# Patient Record
Sex: FEMALE | Race: WHITE | NOT HISPANIC OR LATINO | Employment: FULL TIME | URBAN - NONMETROPOLITAN AREA
[De-identification: names, ages, dates, MRNs, and addresses within clinical notes are randomized per-mention and may not be internally consistent; named-entity substitution may affect disease eponyms.]

---

## 2017-08-03 ENCOUNTER — HISTORY (OUTPATIENT)
Dept: FAMILY MEDICINE | Facility: OTHER | Age: 22
End: 2017-08-03

## 2017-08-03 ENCOUNTER — OFFICE VISIT - GICH (OUTPATIENT)
Dept: FAMILY MEDICINE | Facility: OTHER | Age: 22
End: 2017-08-03

## 2017-08-03 DIAGNOSIS — R51.9 HEADACHE: ICD-10-CM

## 2017-08-03 DIAGNOSIS — J30.89 OTHER ALLERGIC RHINITIS: ICD-10-CM

## 2017-08-03 DIAGNOSIS — H93.8X3 OTHER SPECIFIED DISORDERS OF EAR, BILATERAL: ICD-10-CM

## 2017-08-03 DIAGNOSIS — R09.82 POSTNASAL DRIP: ICD-10-CM

## 2017-08-03 DIAGNOSIS — J34.89 OTHER SPECIFIED DISORDERS OF NOSE AND NASAL SINUSES: ICD-10-CM

## 2017-09-24 ENCOUNTER — OFFICE VISIT - GICH (OUTPATIENT)
Dept: FAMILY MEDICINE | Facility: OTHER | Age: 22
End: 2017-09-24

## 2017-09-24 ENCOUNTER — HISTORY (OUTPATIENT)
Dept: FAMILY MEDICINE | Facility: OTHER | Age: 22
End: 2017-09-24

## 2017-09-24 DIAGNOSIS — Z01.89 ENCOUNTER FOR OTHER SPECIFIED SPECIAL EXAMINATIONS (CODE): ICD-10-CM

## 2017-09-24 DIAGNOSIS — J02.9 ACUTE PHARYNGITIS: ICD-10-CM

## 2017-09-24 DIAGNOSIS — K12.2 CELLULITIS AND ABSCESS OF MOUTH: ICD-10-CM

## 2017-09-24 DIAGNOSIS — J03.90 ACUTE TONSILLITIS: ICD-10-CM

## 2017-09-24 LAB — STREP A ANTIGEN - HISTORICAL: NEGATIVE

## 2017-09-24 ASSESSMENT — PATIENT HEALTH QUESTIONNAIRE - PHQ9: SUM OF ALL RESPONSES TO PHQ QUESTIONS 1-9: 4

## 2017-11-09 ENCOUNTER — HISTORY (OUTPATIENT)
Dept: EMERGENCY MEDICINE | Facility: OTHER | Age: 22
End: 2017-11-09

## 2017-11-11 ENCOUNTER — COMMUNICATION - GICH (OUTPATIENT)
Dept: EMERGENCY MEDICINE | Facility: OTHER | Age: 22
End: 2017-11-11

## 2017-11-11 ENCOUNTER — HOSPITAL ENCOUNTER (OUTPATIENT)
Dept: LAB | Facility: OTHER | Age: 22
End: 2017-11-11

## 2017-11-11 DIAGNOSIS — N93.9 ABNORMAL UTERINE AND VAGINAL BLEEDING, UNSPECIFIED (CODE): ICD-10-CM

## 2017-11-11 LAB — HCG BETA QUANT,PREGNANCY - HISTORICAL: ABNORMAL MIU/ML

## 2017-11-12 ENCOUNTER — COMMUNICATION - GICH (OUTPATIENT)
Dept: EMERGENCY MEDICINE | Facility: OTHER | Age: 22
End: 2017-11-12

## 2017-12-27 NOTE — PROGRESS NOTES
Patient Information     Patient Name MRN Iris Graves [id number] Female 1995      Progress Notes by Cherrie Chavez NP at 2017 11:45 AM     Author:  Cherrie Chavez NP Service:  (none) Author Type:  PHYS- Nurse Practitioner     Filed:  2017  2:47 PM Encounter Date:  2017 Status:  Signed     :  Cherrie Chavez NP (PHYS- Nurse Practitioner)            HPI:    Iris Negron is a 22 y.o. female who presents to clinic today for strep testing.   Sore throat x 5 days, worsening.  Painful to swallow, eat, drink.   Pain is only on right side of throat, right tonsil.  Runny nose and sneezing from allergies.  No cough.  No known fevers.  Appetite decreased from painful swallowing.  Headaches the past few days.  No ear pain.  Pressure in right ear.  Chronic body aches.  Energy decreased, fair.  No drooling.  No difficulty opening mouth.   Taking Ibuprofen and Tylenol without relief.  Tried cough drops.          Past Medical History:     Diagnosis  Date     Acne      Asthma      Depression      Past Surgical History:      Procedure  Laterality Date     APPENDECTOMY       WISDOM TEETH EXTRACTION        Social History       Substance Use Topics         Smoking status:   Current Every Day Smoker     Smokeless tobacco:   Never Used      Comment: smokes ecigs      Alcohol use   No     Current Outpatient Prescriptions       Medication  Sig Dispense Refill     albuterol HFA (VENTOLIN HFA) 90 mcg/actuation inhaler Inhale 2 Puffs by mouth 4 times daily if needed. 1 Inhaler 0     diphenhydrAMINE (BENADRYL) 25 mg capsule Take 25 mg by mouth every 4 hours if needed.         EPINEPHrine (EPIPEN) 0.3 mg/0.3 mL injection Inject 0.3 mg intramuscular one time if needed for Allergic Reaction for up to 1 dose. 2 Each 3     FLUoxetine (PROZAC) 10 mg capsule Take 10 mg by mouth.       fluticasone (FLOVENT) 110 mcg/Actuation inhaler Inhale 1 Puff by mouth.       ibuprofen (ADVIL; MOTRIN) 800  mg tablet Take 1 tablet by mouth 3 times daily if needed. 90 tablet 0     loratadine (CLARITIN) 10 mg tablet Take 10 mg by mouth once daily.       MULTIVITAMIN ORAL Take 1 Tab by mouth once daily.       sod bicarb-sod chlor-neti pot pkdv Irrigate sinuses 2-3 times daily as needed for congestion. 1 Each 0     No current facility-administered medications for this visit.      Medications have been reviewed by me and are current to the best of my knowledge and ability.    Allergies      Allergen   Reactions     Asa Buff (Mag Carb-Al Glyc) [Aspirin, Buffered]  Other - Describe In Comment Field     Dizzy      Gluten  Diarrhea     Iodine  Hives     Latex  Hives     Novacaine [Procaine]  Nausea And Vomiting     Other [Unlisted Allergen (Include Detail In Comments)]  Anaphylaxis and Hives     Melons, horse radish, mushrooms      Strawberry  Anaphylaxis and Hives       Past medical history, past surgical history, current medications and allergies reviewed and accurate to the best of my knowledge.        ROS:  Refer to HPI    /70  Pulse 60  Temp 98.9  F (37.2  C) (Tympanic)   Wt 92.3 kg (203 lb 6.4 oz)  BMI 37.2 kg/m2    EXAM:  General Appearance: Well appearing adult female, non ill appearance, appropriate appearance for age. No acute distress  Head: normocephalic, atraumatic  Ears: Left TM with bony landmarks appreciated, no erythema, no effusion, no bulging, no purulence.  Right TM with bony landmarks appreciated, no erythema, no effusion, no bulging, no purulence.   Left auditory canal clear.  Right auditory canal clear.  Normal external ears, non tender.  Eyes: conjunctivae normal, no drainage  Orophayrnx: moist mucous membranes, posterior pharynx with erythema, Right tonsil with 2+ hypertrophy and erythema, no exudates, Left tonsil with 1-2+ hypertrophy, left without erythema or exudates, no petechiae, no post nasal drip seen, no oral ulcers.  Uvula midline without deviation.  No trismus.  No drooling.  No  muffled speech.    Neck: supple without adenopathy - no palpable lymph node swelling but entire right side of neck with tenderness to palpation   Respiratory: normal chest wall and respirations.  Normal effort.  Clear to auscultation bilaterally, no wheezing, crackles or rhonchi.  No increased work of breathing.  No cough appreciated.  Cardiac: RRR with no murmurs  Musculoskeletal:  Normal gait.  Equal movement of bilateral upper extremities.  Equal movement of bilateral lower extremities.    Dermatological: no facial erythema or rash   Psychological: normal affect, alert and pleasant      Labs:  Results for orders placed or performed in visit on 09/24/17      RAPID STREP WITH REFLEX CULTURE      Result  Value Ref Range    STREP A ANTIGEN           Negative Negative             ASSESSMENT/PLAN:    ICD-10-CM    1. Sore throat J02.9 RAPID STREP WITH REFLEX CULTURE      RAPID STREP WITH REFLEX CULTURE   2. Patient request for diagnostic testing Z01.89 RAPID STREP WITH REFLEX CULTURE      RAPID STREP WITH REFLEX CULTURE   3. Oral cellulitis K12.2 dexamethasone 10 mg inj for oral, topical or inhalation use (DECADRON)      amoxicillin-clavulanate 875-125 mg tablet (AUGMENTIN)   4. Acute tonsillitis, unspecified J03.90 dexamethasone 10 mg inj for oral, topical or inhalation use (DECADRON)      amoxicillin-clavulanate 875-125 mg tablet (AUGMENTIN)         Negative rapid strep test  Augmentin 875-125 mg BID x 10 days for acute right sided tonsillitis   Decadron 10 mg oral x 1 administered in clinic for throat/tonsil swelling, pain  Encouraged fluids  Symptomatic treatment - salt water gargles, honey, elevation,  lozenges, etc   Tylenol or ibuprofen PRN  Dicussed warning signs/symptoms that require immediate follow up including but not limited to: drooling, difficulty talking, choking, high fevers, facial or neck swelling, etc   Follow up if symptoms persist or worsen or concerns          Patient Instructions   Right sided  tonsillitis    Augmentin twice daily x 10 days       Decadron (steroid) for pain and swelling given in clinic      Salt water gargles    Fluids    Honey, tea, etc      Follow up if any worsening or concerns

## 2017-12-27 NOTE — PROGRESS NOTES
Patient Information     Patient Name MRN Sex Iris Tsang [id number] Female 1995      Progress Notes by Cherrie Chavez NP at 8/3/2017  3:15 PM     Author:  Cherrie Chavez NP Service:  (none) Author Type:  PHYS- Nurse Practitioner     Filed:  8/3/2017  5:11 PM Encounter Date:  8/3/2017 Status:  Signed     :  Cherrie Chavez NP (PHYS- Nurse Practitioner)            HPI:    Iris Negron is a 22 y.o. female who presents to clinic today for sinus.   Sneezing for the past few weeks, worsening.  Post nasal drainage for the past few days, causing her to vomit today from all the phlegm.   No abdominal pain.   Feeling dizzy.   Headaches for the past week, progressively worsening.  Sinus pressure for the past few days.  Mild nasal congestion.   Throat irritated.   Coughing due to irritation and drainage.  No chest tightness or shortness of breath.  No fevers.  Chills and sweats.  Appetite varies.   Energy decreased.  Taking Benadryl.  No Albuterol inhaler or Flovent inhaler use for the past week.  No Claritin use in the past 1-2 months.  No nasal spray use - states she gets nosebleeds from use.  No netti pot use.            Past Medical History:     Diagnosis  Date     Acne      Asthma      Depression      Past Surgical History:      Procedure  Laterality Date     APPENDECTOMY  2001     WISDOM TEETH EXTRACTION        Social History       Substance Use Topics         Smoking status:   Current Every Day Smoker     Smokeless tobacco:   Never Used      Comment: smokes ecigs      Alcohol use   No     Current Outpatient Prescriptions       Medication  Sig Dispense Refill     albuterol HFA (VENTOLIN HFA) 90 mcg/actuation inhaler Inhale 2 Puffs by mouth 4 times daily if needed. 1 Inhaler 0     diphenhydrAMINE (BENADRYL) 25 mg capsule Take 25 mg by mouth every 4 hours if needed.         EPINEPHrine (EPIPEN) 0.3 mg/0.3 mL injection Inject 0.3 mg intramuscular one time if needed for Allergic  "Reaction for up to 1 dose. 2 Each 3     FLUoxetine (PROZAC) 10 mg capsule Take 10 mg by mouth.       fluticasone (FLOVENT) 110 mcg/Actuation inhaler Inhale 1 Puff by mouth.       ibuprofen (ADVIL; MOTRIN) 800 mg tablet Take 1 tablet by mouth 3 times daily if needed. 90 tablet 0     loratadine (CLARITIN) 10 mg tablet Take 10 mg by mouth once daily.       MULTIVITAMIN ORAL Take 1 Tab by mouth once daily.       sod bicarb-sod chlor-neti pot pkdv Irrigate sinuses 2-3 times daily as needed for congestion. 1 Each 0     No current facility-administered medications for this visit.      Medications have been reviewed by me and are current to the best of my knowledge and ability.    Allergies      Allergen   Reactions     Asa Buff (Mag Carb-Al Glyc) [Aspirin, Buffered]  Other - Describe In Comment Field     Dizzy      Gluten  Diarrhea     Iodine  Hives     Latex  Hives     Novacaine [Procaine]  Nausea And Vomiting     Other [Unlisted Allergen (Include Detail In Comments)]  Anaphylaxis and Hives     Melons, horse radish, mushrooms      Strawberry  Anaphylaxis and Hives       Past medical history, past surgical history, current medications and allergies reviewed and accurate to the best of my knowledge.        ROS:  Refer to HPI    /72  Pulse 68  Temp 99.4  F (37.4  C) (Tympanic)   Ht 1.575 m (5' 2\")  Wt 93.1 kg (205 lb 3.2 oz)  BMI 37.53 kg/m2    EXAM:  General Appearance: Well appearing adult female, appropriate appearance for age. No acute distress  Head: normocephalic, atraumatic  Ears: Left TM with bony landmarks appreciated, no erythema, no effusion, no bulging, no purulence.  Right TM with bony landmarks appreciated, no erythema, no effusion, no bulging, no purulence.   Left auditory canal clear.  Right auditory canal clear.  Normal external ears, non tender.  Eyes: conjunctivae normal, no drainage  Orophayrnx: moist mucous membranes, posterior pharynx without erythema, tonsils without hypertrophy, no " erythema, no exudates or petechiae, no post nasal drip seen.    Sinuses:  Bilateral sinus tenderness upon palpation of the frontal and maxillary sinuses  Nose:  Bilateral nares without erythema or edema; clear drainage present  Neck: supple without adenopathy  Respiratory: normal chest wall and respirations.  Normal effort.  Clear to auscultation bilaterally, no wheezing, crackles or rhonchi.  No increased work of breathing.  No cough appreciated  Cardiac: RRR with no murmurs  Musculoskeletal:  Normal gait.  Equal movement of bilateral upper extremities.  Equal movement of bilateral lower extremities.    Psychological: normal affect, alert and pleasant        ASSESSMENT/PLAN:    ICD-10-CM    1. Sinus pressure J34.89 predniSONE (DELTASONE) 20 mg tablet   2. Environmental and seasonal allergies J30.89    3. Sinus headache R51 predniSONE (DELTASONE) 20 mg tablet   4. Ear pressure, bilateral H93.8X3    5. Post-nasal drainage R09.82          Likely combination of allergies and viral illness.   No indications for antibiotics.  Prednisone 40 mg daily x 3 days for sinus inflammation and pressure.   Take with food.  Restart Claritin 10 mg daily   Symptomatic treatment - elevation, humidifier, sinus rinse/netti pot, Benadryl PRN, etc   Tylenol or ibuprofen PRN  Follow up if symptoms persist or worsen or concerns          Patient Instructions   Prednisone once daily x 3 days for sinus pressure/headaches/inflammation.   Take with food.    Restart Claritin      Follow up as needed if worsening

## 2017-12-28 NOTE — TELEPHONE ENCOUNTER
Patient Information     Patient Name MRN Iris Graves [id number] Female 1995      Telephone Encounter by Christel Chou RN at 2017  6:15 PM     Author:  Christel Chou RN Service:  (none) Author Type:  NURS- Registered Nurse     Filed:  2017  6:17 PM Encounter Date:  2017 Status:  Signed     :  Christel Chou RN (NURS- Registered Nurse)            Call received from patient wanting to know results of Beta HCG drawn today. Dr. Buchanan consulted and return call placed to patient notifying her that her lab values had increased as expected with a viable pregnancy. Instructed to contact PCP wioth any questions or concerns. CHRISTEL CHOU RN ....................  2017   6:16 PM

## 2017-12-28 NOTE — PATIENT INSTRUCTIONS
Patient Information     Patient Name MRN Sex Iris Tsang [id number] Female 1995      Patient Instructions by Cherrie Chavez NP at 2017 11:45 AM     Author:  Cherrie Chavez NP Service:  (none) Author Type:  PHYS- Nurse Practitioner     Filed:  2017 12:09 PM Encounter Date:  2017 Status:  Signed     :  Cherrie Chavez NP (PHYS- Nurse Practitioner)            Right sided tonsillitis    Augmentin twice daily x 10 days       Decadron (steroid) for pain and swelling given in clinic      Salt water gargles    Fluids    Honey, tea, etc      Follow up if any worsening or concerns

## 2017-12-28 NOTE — TELEPHONE ENCOUNTER
Patient Information     Patient Name MRN Sex Iris Tsang [id number] Female 1995      Telephone Encounter by Christel Chou RN at 2017  7:55 AM     Author:  Christel Chou RN Service:  (none) Author Type:  NURS- Registered Nurse     Filed:  2017  7:56 AM Encounter Date:  2017 Status:  Signed     :  Christel Chou RN (NURS- Registered Nurse)            Opened in error

## 2017-12-28 NOTE — PATIENT INSTRUCTIONS
Patient Information     Patient Name MRN Iris Graves [id number] Female 1995      Patient Instructions by Cherrie Chavez NP at 8/3/2017  3:15 PM     Author:  Cherrie Chavez NP Service:  (none) Author Type:  PHYS- Nurse Practitioner     Filed:  8/3/2017  3:29 PM Encounter Date:  8/3/2017 Status:  Signed     :  Cherrie Chavez NP (PHYS- Nurse Practitioner)            Prednisone once daily x 3 days for sinus pressure/headaches/inflammation.   Take with food.    Restart Claritin      Follow up as needed if worsening

## 2017-12-30 NOTE — NURSING NOTE
Patient Information     Patient Name MRIris Rojas [id number] Female 1995      Nursing Note by Ember Rodriguez at 2017 11:45 AM     Author:  Ember Rodriguez Service:  (none) Author Type:  (none)     Filed:  2017 12:03 PM Encounter Date:  2017 Status:  Signed     :  Ember Rodriguez            Patient states that she has had a sore throat for 5 days.  The last couple it has become worse.  Hurts to eat or drink  Ember Rodriguez LPN 2017 11:30 AM

## 2017-12-30 NOTE — NURSING NOTE
Patient Information     Patient Name MRN Iris Graves [id number] Female 1995      Nursing Note by Marline Li at 8/3/2017  3:15 PM     Author:  Marline Li Service:  (none) Author Type:  (none)     Filed:  8/3/2017  3:20 PM Encounter Date:  8/3/2017 Status:  Signed     :  Marline Li            Patient presents to the clinic for sinus pressure and headaches that started weeks ago. Patient states she's been having sinus issues for weeks and the symptoms have been getting worse.  Marline GIL, EBER.......8/3/2017..3:11 PM

## 2018-01-25 VITALS
BODY MASS INDEX: 37.76 KG/M2 | HEART RATE: 68 BPM | WEIGHT: 205.2 LBS | SYSTOLIC BLOOD PRESSURE: 128 MMHG | DIASTOLIC BLOOD PRESSURE: 72 MMHG | TEMPERATURE: 99.4 F | HEIGHT: 62 IN

## 2018-01-25 VITALS
TEMPERATURE: 98.9 F | BODY MASS INDEX: 37.19 KG/M2 | WEIGHT: 203.4 LBS | DIASTOLIC BLOOD PRESSURE: 70 MMHG | HEART RATE: 60 BPM | SYSTOLIC BLOOD PRESSURE: 110 MMHG

## 2018-02-01 ASSESSMENT — PATIENT HEALTH QUESTIONNAIRE - PHQ9: SUM OF ALL RESPONSES TO PHQ QUESTIONS 1-9: 4

## 2018-02-09 ENCOUNTER — DOCUMENTATION ONLY (OUTPATIENT)
Dept: FAMILY MEDICINE | Facility: OTHER | Age: 23
End: 2018-02-09

## 2018-02-09 PROBLEM — J30.9 ALLERGIC RHINITIS: Status: ACTIVE | Noted: 2018-02-09

## 2018-02-09 PROBLEM — J45.909 ASTHMA: Status: ACTIVE | Noted: 2018-02-09

## 2018-02-09 PROBLEM — N92.6 IRREGULAR MENSES: Status: ACTIVE | Noted: 2018-02-09

## 2018-02-09 PROBLEM — N94.6 DYSMENORRHEA: Status: ACTIVE | Noted: 2018-02-09

## 2018-02-09 RX ORDER — IBUPROFEN 800 MG/1
800 TABLET, FILM COATED ORAL 3 TIMES DAILY PRN
COMMUNITY
Start: 2015-01-07 | End: 2019-09-21

## 2018-02-09 RX ORDER — DIPHENHYDRAMINE HCL 25 MG
25 CAPSULE ORAL EVERY 4 HOURS PRN
COMMUNITY
End: 2022-11-27

## 2018-02-09 RX ORDER — FLUOXETINE 10 MG/1
10 CAPSULE ORAL
COMMUNITY
Start: 2016-03-14 | End: 2019-09-21

## 2018-02-09 RX ORDER — FLUTICASONE PROPIONATE 110 UG/1
1 AEROSOL, METERED RESPIRATORY (INHALATION)
COMMUNITY
Start: 2017-06-22 | End: 2018-06-03

## 2018-02-09 RX ORDER — EPINEPHRINE 0.3 MG/.3ML
0.3 INJECTION SUBCUTANEOUS ONCE
COMMUNITY
Start: 2016-08-03 | End: 2022-11-27

## 2018-02-09 RX ORDER — ALBUTEROL SULFATE 90 UG/1
2 AEROSOL, METERED RESPIRATORY (INHALATION) 4 TIMES DAILY PRN
COMMUNITY
Start: 2015-01-07

## 2018-02-09 RX ORDER — LORATADINE 10 MG/1
10 TABLET ORAL DAILY
COMMUNITY

## 2018-06-03 ENCOUNTER — HOSPITAL ENCOUNTER (EMERGENCY)
Facility: OTHER | Age: 23
Discharge: HOME | End: 2018-06-03
Attending: PHYSICIAN ASSISTANT | Admitting: PHYSICIAN ASSISTANT
Payer: COMMERCIAL

## 2018-06-03 VITALS
OXYGEN SATURATION: 100 % | WEIGHT: 213 LBS | TEMPERATURE: 99.8 F | DIASTOLIC BLOOD PRESSURE: 85 MMHG | HEIGHT: 62 IN | BODY MASS INDEX: 39.2 KG/M2 | HEART RATE: 86 BPM | SYSTOLIC BLOOD PRESSURE: 107 MMHG | RESPIRATION RATE: 16 BRPM

## 2018-06-03 DIAGNOSIS — O03.9 COMPLETE MISCARRIAGE: ICD-10-CM

## 2018-06-03 DIAGNOSIS — R04.0 EPISTAXIS: ICD-10-CM

## 2018-06-03 LAB
B-HCG SERPL-ACNC: 17 IU/L
HCG UR QL: NEGATIVE

## 2018-06-03 PROCEDURE — 99283 EMERGENCY DEPT VISIT LOW MDM: CPT | Performed by: PHYSICIAN ASSISTANT

## 2018-06-03 PROCEDURE — 99283 EMERGENCY DEPT VISIT LOW MDM: CPT | Mod: Z6 | Performed by: PHYSICIAN ASSISTANT

## 2018-06-03 PROCEDURE — 84702 CHORIONIC GONADOTROPIN TEST: CPT | Performed by: PHYSICIAN ASSISTANT

## 2018-06-03 PROCEDURE — 81025 URINE PREGNANCY TEST: CPT | Performed by: PHYSICIAN ASSISTANT

## 2018-06-03 PROCEDURE — 36415 COLL VENOUS BLD VENIPUNCTURE: CPT | Performed by: PHYSICIAN ASSISTANT

## 2018-06-03 PROCEDURE — 25000125 ZZHC RX 250: Performed by: PHYSICIAN ASSISTANT

## 2018-06-03 RX ORDER — TRANEXAMIC ACID 100 MG/ML
500 INJECTION, SOLUTION INTRAVENOUS ONCE
Status: COMPLETED | OUTPATIENT
Start: 2018-06-03 | End: 2018-06-03

## 2018-06-03 RX ADMIN — TRANEXAMIC ACID 500 MG: 100 INJECTION, SOLUTION INTRAVENOUS at 13:50

## 2018-06-03 ASSESSMENT — ENCOUNTER SYMPTOMS
NECK STIFFNESS: 0
FEVER: 0
SHORTNESS OF BREATH: 0
EYE REDNESS: 0
HEADACHES: 0
COLOR CHANGE: 0
ABDOMINAL PAIN: 0
DIFFICULTY URINATING: 0
CONFUSION: 0
ARTHRALGIAS: 0

## 2018-06-03 NOTE — ED PROVIDER NOTES
History     Chief Complaint   Patient presents with     Epistaxis     HPI Comments: This is a 23 yo female who uis currently 6 weeks pregnant .  She has a fairly complex Hx of a liver mass and liver lobectomy.  She was working today when she had nasal bleed that lasted 10 minutes. She had a dime sized nasal blood clot come out and her bleeding has resolved at this point but EMS was called and she is brought via ambulance to the ER. She reports she has had some mionimal vaginal spotting at times but none today.  She has been seeing Dr. Powell at [facility] for her pregnancy.  She reports she has had 3  miscarriages in the past.  She does have some concerns that she may have had another miscarriage.       The history is provided by the patient and the EMS personnel.     Problem List:    Patient Active Problem List    Diagnosis Date Noted     Dysmenorrhea 02/09/2018     Priority: Medium     Irregular menses 02/09/2018     Priority: Medium     Allergic rhinitis 02/09/2018     Priority: Medium     Overview:   Intermittent, seasonal       Asthma 02/09/2018     Priority: Medium     Allergy 08/03/2016     Priority: Medium     Overview:   Strawberries and melons       Chronic tension-type headache, not intractable 08/03/2016     Priority: Medium     Gastroenteritis 12/28/2012     Priority: Medium     Dysthymic disorder 03/29/2011     Priority: Medium     Obesity 02/16/2010     Priority: Medium     Acne, mild 02/08/2008     Priority: Medium        Past Medical History:    Past Medical History:   Diagnosis Date     Acne      Major depressive disorder, single episode      Uncomplicated asthma        Past Surgical History:    Past Surgical History:   Procedure Laterality Date     APPENDECTOMY OPEN      2001     EXTRACTION(S) DENTAL      2011       Family History:    Family History   Problem Relation Age of Onset     Other - See Comments Mother      Didelphic uterus/menorrhagia/hysterectomy at age 25     Substance Abuse Mother      " Alcohol/Drug,CD     Other - See Comments Paternal Grandmother      Psychiatric illness,Mental illness     Breast Cancer Maternal Grandmother      Cancer-breast     DIABETES Maternal Grandmother      Diabetes     Hypertension Maternal Grandmother      Hypertension     DIABETES Maternal Aunt      Diabetes     DIABETES Maternal Grandfather      Diabetes     Hypertension Maternal Grandfather      Hypertension     Hypertension Maternal Aunt      Hypertension     Substance Abuse Maternal Uncle      Alcohol/Drug     Substance Abuse Maternal Uncle      Alcohol/Drug       Social History:  Marital Status:  Single [1]  Social History   Substance Use Topics     Smoking status: Former Smoker     Smokeless tobacco: Never Used      Comment: Quit smoking: smokes ecigs     Alcohol use No        Medications:      albuterol (PROAIR HFA/PROVENTIL HFA/VENTOLIN HFA) 108 (90 BASE) MCG/ACT Inhaler   amitriptyline (ELAVIL) 25 MG tablet   FLUoxetine (PROZAC) 10 MG capsule   ibuprofen (ADVIL/MOTRIN) 800 MG tablet   Prenatal Vit-DSS-Fe Cbn-FA (PRENATAL AD PO)   diphenhydrAMINE (BENADRYL) 25 MG capsule   EPINEPHrine (EPIPEN/ADRENACLICK/OR ANY BX GENERIC EQUIV) 0.3 MG/0.3ML injection 2-pack   loratadine (CLARITIN) 10 MG tablet         Review of Systems   Constitutional: Negative for fever.   HENT: Positive for nosebleeds. Negative for congestion.    Eyes: Negative for redness.   Respiratory: Negative for shortness of breath.    Cardiovascular: Negative for chest pain.   Gastrointestinal: Negative for abdominal pain.   Genitourinary: Positive for vaginal bleeding. Negative for difficulty urinating.   Musculoskeletal: Negative for arthralgias and neck stiffness.   Skin: Negative for color change.   Neurological: Negative for headaches.   Psychiatric/Behavioral: Negative for confusion.       Physical Exam   BP: (!) 128/100  Pulse: 97  Temp: 99.8  F (37.7  C)  Resp: 18  Height: 157.5 cm (5' 2\")  Weight: 96.6 kg (213 lb)  SpO2: 100 %      Physical " Exam   Constitutional: She is oriented to person, place, and time. No distress.   HENT:   Head: Atraumatic.   Nose: Epistaxis is observed.   Mouth/Throat: Oropharynx is clear and moist. No oropharyngeal exudate.   Left nasal bleeding , resolved at this time.    Eyes: Pupils are equal, round, and reactive to light. No scleral icterus.   Cardiovascular: Normal heart sounds and intact distal pulses.    Pulmonary/Chest: Breath sounds normal. No respiratory distress.   Abdominal: Soft. Bowel sounds are normal. There is no tenderness.   Musculoskeletal: She exhibits no edema or tenderness.   Neurological: She is alert and oriented to person, place, and time.   Skin: Skin is warm. No rash noted. She is not diaphoretic.       ED Course     ED Course     Procedures             Results for orders placed or performed during the hospital encounter of 06/03/18 (from the past 24 hour(s))   HCG qualitative urine (UPT)   Result Value Ref Range    HCG Qual Urine Negative NEG^Negative   HCG quantitative pregnancy   Result Value Ref Range    HCG Quantitative Serum 17 IU/L       Medications   tranexamic acid (CYKLOKAPRON) spray 500 mg (500 mg Both Nostrils Given 6/3/18 1350)       Assessments & Plan (with Medical Decision Making)     I have reviewed the nursing notes.    I have reviewed the findings, diagnosis, plan and need for follow up with the patient.      Discharge Medication List as of 6/3/2018  2:40 PM      START taking these medications    Details   phenylephrine (CHAN-SYNEPHRINE) 0.5 % spray Spray 1 drop into both nostrils every 4 hours as needed for congestion, Disp-15 mL, R-0, Local Print             Final diagnoses:   Epistaxis   Complete miscarriage     Afebrile.  VSS.  Left nare epistaxis that has resolved.  6 weeks pregnant per the patient.  She had tranexamic acid appied ansd she was observed over an extended time frame in the ER with no return of her epistaxis.  UA is unremarkable.  HCG is negative.  Quantitative HCG  shows decreased levels at 17 .  Sorry to  Inform the patient it appears she has miscarried again.  Consolation given.  Rx for neosyn if needed for further nasal bleeding.  Discussed further evaluation with a specialist for bleeding disorders due to her multiple miscarriages.  She will arrange this.  Follow up with her PCP if any concerns for further evaluation as needed.   6/3/2018   [facility]     Curt Robertson PA-C  06/03/18 1326

## 2018-06-03 NOTE — ED TRIAGE NOTES
Pt admit to [facility] via [facility].  Pt had a nose bleed lasting about 10 minutes today, passed a dime sized blood clot.  Bleeding has stopped but pt reports feeling light headed & dizzy.  Pt is 6 weeks pregnant but has had some vaginal bleeding recently and has had miscarriages in the past.  Pt also had a tumor removed from her liver on April 16th without complications.

## 2018-06-03 NOTE — ED AVS SNAPSHOT
[facility]    [address]    [place] [address]    Phone:  [phone]    Fax:  [phone]                                       Iris Negron   MRN: [id number]    Department:  [facility]   Date of Visit:  6/3/2018           After Visit Summary Signature Page     I have received my discharge instructions, and my questions have been answered. I have discussed any challenges I see with this plan with the nurse or doctor.    ..........................................................................................................................................  Patient/Patient Representative Signature      ..........................................................................................................................................  Patient Representative Print Name and Relationship to Patient    ..................................................               ................................................  Date                                            Time    ..........................................................................................................................................  Reviewed by Signature/Title    ...................................................              ..............................................  Date                                                            Time

## 2018-06-03 NOTE — ED AVS SNAPSHOT
[facility]    [address]    [place] [address]    Phone:  [phone]    Fax:  [phone]                                       Iris Negron   MRN: [id number]    Department:  [facility]   Date of Visit:  6/3/2018           Patient Information     Date Of Birth          1995        Your diagnoses for this visit were:     Epistaxis     Complete miscarriage        You were seen by Curt Robertson PA-C.      Follow-up Information     Schedule an appointment as soon as possible for a visit with Melba Powell.    Specialty:  Family Practice    Why:  As needed, If symptoms worsen    Contact information:    [facility]  [address] [facility] [address]  [phone]        Discharge References/Attachments     [facility] (ENGLISH)      24 Hour Appointment Hotline       To make an appointment at any [facility] ([facility]), call [phone] ([phone]). If you don't have a family doctor or clinic, we will help you find one. [place] clinics are conveniently located to serve the needs of you and your family.             Review of your medicines      START taking        Dose / Directions Last dose taken    phenylephrine 0.5 % spray   Commonly known as:  CHAN-SYNEPHRINE   Dose:  1 drop   Quantity:  15 mL        Spray 1 drop into both nostrils every 4 hours as needed for congestion   Refills:  0          Our records show that you are taking the medicines listed below. If these are incorrect, please call your family doctor or clinic.        Dose / Directions Last dose taken    albuterol 108 (90 Base) MCG/ACT Inhaler   Commonly known as:  PROAIR HFA/PROVENTIL HFA/VENTOLIN HFA   Dose:  2 puff        Inhale 2 puffs into the lungs 4 times daily as needed   Refills:  0        amitriptyline 25 MG tablet   Commonly known as:  ELAVIL   Dose:  25 mg        Take 25 mg by mouth   Refills:  0        diphenhydrAMINE 25 MG capsule   Commonly known as:  BENADRYL    Dose:  25 mg        Take 25 mg by mouth every 4 hours as needed   Refills:  0        EPINEPHrine 0.3 MG/0.3ML injection 2-pack   Commonly known as:  EPIPEN/ADRENACLICK/or ANY BX GENERIC EQUIV   Dose:  0.3 mg        Inject 0.3 mg into the muscle once   Refills:  0        FLUoxetine 10 MG capsule   Commonly known as:  PROzac   Dose:  10 mg        Take 10 mg by mouth   Refills:  0        ibuprofen 800 MG tablet   Commonly known as:  ADVIL/MOTRIN   Dose:  800 mg        Take 800 mg by mouth 3 times daily as needed   Refills:  0        loratadine 10 MG tablet   Commonly known as:  CLARITIN   Dose:  10 mg        Take 10 mg by mouth daily   Refills:  0        PRENATAL AD PO        Take by mouth daily   Refills:  0                Prescriptions were sent or printed at these locations (1 Prescription)                   [facility] - [place], [place] - [address] AT SEC of [facility] [address]   [address], [facility] [address]    Telephone:  [phone]   Fax:  [phone]   Hours:                  Printed at Department/Unit printer (1 of 1)         phenylephrine (CHAN-SYNEPHRINE) 0.5 % spray                Procedures and tests performed during your visit     HCG qualitative urine (UPT)    HCG quantitative pregnancy      Orders Needing Specimen Collection     None      Pending Results     No orders found from 6/1/2018 to 6/4/2018.            Pending Culture Results     No orders found from 6/1/2018 to 6/4/2018.            Pending Results Instructions     If you had any lab results that were not finalized at the time of your Discharge, you can call the ED Lab Result RN at [phone]. You will be contacted by this team for any positive Lab results or changes in treatment. The nurses are available 7 days a week from 10A to 6:30P.  You can leave a message 24 hours per day and they will return your call.        Thank you for choosing [facility]       Thank you for choosing [facility] for your care. Our goal is  "always to provide you with excellent care. Hearing back from our patients is one way we can continue to improve our services. Please take a few minutes to complete the written survey that you may receive in the mail after you visit with us. Thank you!        [facility] Information     [facility] lets you send messages to your doctor, view your test results, renew your prescriptions, schedule appointments and more. To sign up, go to [email or web address] . Click on \"Log in\" on the left side of the screen, which will take you to the Welcome page. Then click on \"Sign up Now\" on the right side of the page.     You will be asked to enter the access code listed below, as well as some personal information. Please follow the directions to create your username and password.     Your access code is: [email or web address]  Expires: 2018  2:40 PM     Your access code will  in 90 days. If you need help or a new code, please call your [facility] clinic or [phone].        Care EveryWhere ID     This is your Care EveryWhere ID. This could be used by other organizations to access your [facility] medical records  [id number]        Equal Access to Services     GLENROY GARDINER : Lillian Wright, walenoda roz, qabhavana schafer, marcia yuen. So [facility] [phone].    ATENCIÓN: Si habla español, tiene a davalos disposición servicios gratuitos de asistencia lingüística. Karie al [phone].    We comply with applicable federal civil rights laws and [place] laws. We do not discriminate on the basis of race, color, national origin, age, disability, sex, sexual orientation, or gender identity.            After Visit Summary       This is your record. Keep this with you and show to your community pharmacist(s) and doctor(s) at your next visit.                  "

## 2018-07-23 NOTE — PROGRESS NOTES
Patient Information     Patient Name  Iris Negron MRN  [id number] Sex  Female   1995      Letter by Claudia Bond NP at      Author:  Claudia Bond NP Service:  (none) Author Type:  (none)    Filed:   Encounter Date:  8/3/2017 Status:  (Other)             Work/School Excuse and Restrictions                    Discharged:                                                              3:29 PM  8/3/2017        Iris Negron  was seen today in the [facility].      Iris is able to return to work with no restrictions.                 *As an acute care facility, we do not provide follow-up care and are therefore unable to complete paperwork for injuries requiring more than 72 hours away from work. Patients need to follow up with a primary care or occupational health provider.    **If you have questions regarding the validity of this note, please call the number above.          CLAUDIA BOND NP ....................  8/3/2017   3:29 PM

## 2019-06-19 ENCOUNTER — THERAPY VISIT (OUTPATIENT)
Dept: CHIROPRACTIC MEDICINE | Facility: OTHER | Age: 24
End: 2019-06-19
Attending: CHIROPRACTOR
Payer: COMMERCIAL

## 2019-06-19 DIAGNOSIS — M54.2 CERVICALGIA: ICD-10-CM

## 2019-06-19 DIAGNOSIS — M99.01 SEGMENTAL AND SOMATIC DYSFUNCTION OF CERVICAL REGION: ICD-10-CM

## 2019-06-19 DIAGNOSIS — M54.42 ACUTE MIDLINE LOW BACK PAIN WITH BILATERAL SCIATICA: ICD-10-CM

## 2019-06-19 DIAGNOSIS — M54.41 ACUTE MIDLINE LOW BACK PAIN WITH BILATERAL SCIATICA: ICD-10-CM

## 2019-06-19 DIAGNOSIS — M99.02 SEGMENTAL AND SOMATIC DYSFUNCTION OF THORACIC REGION: ICD-10-CM

## 2019-06-19 DIAGNOSIS — M54.6 PAIN IN THORACIC SPINE: ICD-10-CM

## 2019-06-19 DIAGNOSIS — M99.03 SEGMENTAL AND SOMATIC DYSFUNCTION OF LUMBAR REGION: Primary | ICD-10-CM

## 2019-06-19 PROCEDURE — G0463 HOSPITAL OUTPT CLINIC VISIT: HCPCS

## 2019-06-19 PROCEDURE — 98941 CHIROPRACT MANJ 3-4 REGIONS: CPT | Performed by: CHIROPRACTOR

## 2019-06-19 PROCEDURE — 99202 OFFICE O/P NEW SF 15 MIN: CPT | Mod: 25 | Performed by: CHIROPRACTOR
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
## 2019-06-24 ENCOUNTER — THERAPY VISIT (OUTPATIENT)
Dept: CHIROPRACTIC MEDICINE | Facility: OTHER | Age: 24
End: 2019-06-24
Attending: CHIROPRACTOR
Payer: COMMERCIAL

## 2019-06-24 DIAGNOSIS — M99.02 SEGMENTAL AND SOMATIC DYSFUNCTION OF THORACIC REGION: ICD-10-CM

## 2019-06-24 DIAGNOSIS — M54.41 ACUTE MIDLINE LOW BACK PAIN WITH BILATERAL SCIATICA: ICD-10-CM

## 2019-06-24 DIAGNOSIS — M99.01 SEGMENTAL AND SOMATIC DYSFUNCTION OF CERVICAL REGION: ICD-10-CM

## 2019-06-24 DIAGNOSIS — M54.6 PAIN IN THORACIC SPINE: ICD-10-CM

## 2019-06-24 DIAGNOSIS — M99.03 SEGMENTAL AND SOMATIC DYSFUNCTION OF LUMBAR REGION: Primary | ICD-10-CM

## 2019-06-24 DIAGNOSIS — M54.42 ACUTE MIDLINE LOW BACK PAIN WITH BILATERAL SCIATICA: ICD-10-CM

## 2019-06-24 DIAGNOSIS — M54.2 CERVICALGIA: ICD-10-CM

## 2019-06-24 PROCEDURE — 98941 CHIROPRACT MANJ 3-4 REGIONS: CPT | Performed by: CHIROPRACTOR

## 2019-06-24 PROCEDURE — G0463 HOSPITAL OUTPT CLINIC VISIT: HCPCS

## 2019-07-02 ENCOUNTER — THERAPY VISIT (OUTPATIENT)
Dept: CHIROPRACTIC MEDICINE | Facility: OTHER | Age: 24
End: 2019-07-02
Attending: CHIROPRACTOR
Payer: COMMERCIAL

## 2019-07-02 DIAGNOSIS — M99.01 SEGMENTAL AND SOMATIC DYSFUNCTION OF CERVICAL REGION: Primary | ICD-10-CM

## 2019-07-02 DIAGNOSIS — M54.2 CERVICALGIA: ICD-10-CM

## 2019-07-02 DIAGNOSIS — M54.6 PAIN IN THORACIC SPINE: ICD-10-CM

## 2019-07-02 DIAGNOSIS — M99.04 SEGMENTAL AND SOMATIC DYSFUNCTION OF SACRAL REGION: ICD-10-CM

## 2019-07-02 DIAGNOSIS — M54.50 LEFT-SIDED LOW BACK PAIN WITHOUT SCIATICA, UNSPECIFIED CHRONICITY: ICD-10-CM

## 2019-07-02 DIAGNOSIS — M99.02 SEGMENTAL AND SOMATIC DYSFUNCTION OF THORACIC REGION: ICD-10-CM

## 2019-07-02 PROCEDURE — 98941 CHIROPRACT MANJ 3-4 REGIONS: CPT | Performed by: CHIROPRACTOR

## 2019-07-02 NOTE — PROGRESS NOTES
Visit #:  3    Subjective:  Iris Negron is a 23 year old female who is seen in f/u up for:        Segmental and somatic dysfunction of cervical region  Segmental and somatic dysfunction of thoracic region  Segmental and somatic dysfunction of sacral region  Cervicalgia  Pain in thoracic spine  Left-sided low back pain without sciatica, unspecified chronicity.     Since last visit on 6/24/2019,  Iris Negron reports:    Area of chief complaint:  Cervical :  Symptoms are graded at 3-4/10. The quality is described as achey.  Motion has become more painful recently. Patient feels that they experiencing a flare of symptoms.  Patient reports since last visit having worked more frequently.  Patient believes this to be a contributing factor to flareup of symptoms.  Patient notes also she believes symptoms likely becoming flared due to recently beginning chiropractic care again.  She has recently begun experiencing headache symptoms    Thoracic :  Symptoms are graded at 2/10. The quality is described as stiff, achey.  Patient reports earlier this week feeling as if and upper level rib was out as pain was quite sharp.   Lumbar :  Symptoms are graded at 2/10. The quality is described as stiff, achey.        Objective:  The following was observed:    P: palpatory tendernessElicited along the right side of see 1, T8 midline, and over the left PSIS:    A: static palpation demonstrates intersegmental asymmetry , cervical, thoracic, pelvis  R: motion palpation notes restricted motion, C1 , T8  and Sacrum   T: muscle spasm at level(s): Right suboccipitals, T-spine paraspinals primarily of the intrascapular region and over T8, mild spasm present of the left gluteus medius:      Segmental spinal dysfunction/restrictions found at:  :  C1 Left rotation restricted and Right lateral flexion restricted  T8 Extension restriction  Sacrum Left lateral flexion restricted and Extension restriction.      Assessment: Patient appears to be  experiencing mild flare of pain symptoms.  Patient assessment as stated of increase of symptoms likely due to combination of realignment of spinal joints as well as of undergoing more work recently I believe to be consistent with findings today.  Patient has been responding favorably with chiropractic adjustments and I anticipate this to be the case again with current flareup.    Diagnoses:      1. Segmental and somatic dysfunction of cervical region    2. Segmental and somatic dysfunction of thoracic region    3. Segmental and somatic dysfunction of sacral region    4. Cervicalgia    5. Pain in thoracic spine    6. Left-sided low back pain without sciatica, unspecified chronicity        Patient's condition:  Patient had restrictions pre-manipulation and Symptoms come and go    Treatment effectiveness:  Post manipulation there is better intersegmental movement and Patient claims to feel looser post manipulation      Procedures:  CMT:  10373 Chiropractic manipulative treatment 3-4 regions performed   Cervical: Diversified, C1 , Supine  Thoracic: Diversified, T8, Prone  Pelvis: Diversified, Sacrum , Side posture    Modalities:  None performed this visit    Therapeutic procedures:  None    Response to Treatment  Reduction in symptoms as reported by patient    Prognosis: Good    Progress towards Goals: Patient is making progress towards the goal.     Recommendations:    Instructions:ice 20 minutes every other hour as needed and heat 15 minutes every other hour as needed    Follow-up:  Continue treatment PRN.

## 2019-09-21 ENCOUNTER — HOSPITAL ENCOUNTER (OUTPATIENT)
Dept: GENERAL RADIOLOGY | Facility: OTHER | Age: 24
Discharge: HOME | End: 2019-09-21
Attending: FAMILY MEDICINE | Admitting: FAMILY MEDICINE
Payer: COMMERCIAL

## 2019-09-21 ENCOUNTER — OFFICE VISIT (OUTPATIENT)
Dept: FAMILY MEDICINE | Facility: OTHER | Age: 24
End: 2019-09-21
Attending: FAMILY MEDICINE
Payer: COMMERCIAL

## 2019-09-21 VITALS
WEIGHT: 214.3 LBS | RESPIRATION RATE: 20 BRPM | TEMPERATURE: 99.5 F | OXYGEN SATURATION: 97 % | HEIGHT: 62 IN | SYSTOLIC BLOOD PRESSURE: 122 MMHG | HEART RATE: 98 BPM | BODY MASS INDEX: 39.43 KG/M2 | DIASTOLIC BLOOD PRESSURE: 80 MMHG

## 2019-09-21 DIAGNOSIS — S60.221A CONTUSION OF RIGHT HAND, INITIAL ENCOUNTER: ICD-10-CM

## 2019-09-21 DIAGNOSIS — S69.91XA INJURY OF RIGHT HAND, INITIAL ENCOUNTER: Primary | ICD-10-CM

## 2019-09-21 DIAGNOSIS — S69.91XA INJURY OF RIGHT HAND, INITIAL ENCOUNTER: ICD-10-CM

## 2019-09-21 PROCEDURE — 73130 X-RAY EXAM OF HAND: CPT | Mod: TC,RT

## 2019-09-21 PROCEDURE — G0463 HOSPITAL OUTPT CLINIC VISIT: HCPCS

## 2019-09-21 PROCEDURE — 99214 OFFICE O/P EST MOD 30 MIN: CPT | Performed by: FAMILY MEDICINE

## 2019-09-21 PROCEDURE — G0463 HOSPITAL OUTPT CLINIC VISIT: HCPCS | Mod: 25

## 2019-09-21 RX ORDER — SERTRALINE HYDROCHLORIDE 25 MG/1
TABLET, FILM COATED ORAL
COMMUNITY
Start: 2019-09-06 | End: 2022-11-27

## 2019-09-21 ASSESSMENT — MIFFLIN-ST. JEOR: SCORE: 1665.31

## 2019-09-21 ASSESSMENT — PAIN SCALES - GENERAL: PAINLEVEL: SEVERE PAIN (7)

## 2019-09-22 NOTE — PATIENT INSTRUCTIONS
Patient Education     Hand Contusion  You have a contusion. This is also called a bruise. There is swelling and some bleeding under the skin, but no broken bones. This injury generally takes a few days to a few weeks to heal.  During that time, the bruise will typically change in color from reddish, to purple-blue, to greenish-yellow, then to yellow-brown.  Home care    Elevate the hand to reduce pain and swelling. As much as possible, sit or lie down with the hand raised about the level of your heart. This is especially important during the first 48 hours.    Ice the hand to help reduce pain and swelling. Wrap a cold source (ice pack or ice cubes in a plastic bag) in a thin towel. Apply to the bruised area for 20 minutes every 1 to 2 hours the first day. Continue this 3 to 4 times a day until the pain and swelling goes away.    Unless another medicine was prescribed, you can take acetaminophen, ibuprofen, or naproxen to control pain. (If you have chronic liver or kidney disease or ever had a stomach ulcer or gastrointestinal bleeding, talk with your doctor before using these medicines.)  Follow up  Follow up with your healthcare provider or our staff as advised. Call if you are not improving within 1 to 2 weeks.  When to seek medical advice   Call your healthcare provider right away if you have any of the following:    Increased pain or swelling    Arm becomes cold, blue, numb or tingly    Signs of infection: Warmth, drainage, or increased redness or pain around the bruise    Inability to move the injured hand     Frequent bruising for unknown reasons  Date Last Reviewed: 2/1/2017 2000-2018 [employer]. [address] [place] [address], [place] [address]. All rights reserved. This information is not intended as a substitute for professional medical care. Always follow your healthcare professional's instructions.    Ibuprofen or Tylenol for pain management and if not resolved in 2 weeks to be reexamined in  the clinic.  Hand wrist brace should be worn until hand is feeling much better.
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
## 2019-12-02 ENCOUNTER — OFFICE VISIT (OUTPATIENT)
Dept: FAMILY MEDICINE | Facility: OTHER | Age: 24
End: 2019-12-02
Attending: NURSE PRACTITIONER
Payer: COMMERCIAL

## 2019-12-02 VITALS
WEIGHT: 212 LBS | OXYGEN SATURATION: 98 % | SYSTOLIC BLOOD PRESSURE: 114 MMHG | DIASTOLIC BLOOD PRESSURE: 80 MMHG | TEMPERATURE: 98.9 F | HEART RATE: 105 BPM | RESPIRATION RATE: 18 BRPM | HEIGHT: 62 IN | BODY MASS INDEX: 39.01 KG/M2

## 2019-12-02 DIAGNOSIS — J02.9 ACUTE VIRAL PHARYNGITIS: Primary | ICD-10-CM

## 2019-12-02 DIAGNOSIS — R07.0 THROAT PAIN: ICD-10-CM

## 2019-12-02 LAB
SPECIMEN SOURCE: NORMAL
STREP GROUP A PCR: NOT DETECTED

## 2019-12-02 PROCEDURE — 99213 OFFICE O/P EST LOW 20 MIN: CPT | Performed by: NURSE PRACTITIONER

## 2019-12-02 PROCEDURE — G0463 HOSPITAL OUTPT CLINIC VISIT: HCPCS

## 2019-12-02 PROCEDURE — 87651 STREP A DNA AMP PROBE: CPT | Mod: ZL | Performed by: NURSE PRACTITIONER

## 2019-12-02 PROCEDURE — 25000125 ZZHC RX 250: Performed by: NURSE PRACTITIONER

## 2019-12-02 RX ORDER — DEXAMETHASONE SODIUM PHOSPHATE 4 MG/ML
12 VIAL (ML) INJECTION ONCE
Status: COMPLETED | OUTPATIENT
Start: 2019-12-02 | End: 2019-12-02

## 2019-12-02 RX ADMIN — DEXAMETHASONE SODIUM PHOSPHATE 12 MG: 4 INJECTION, SOLUTION INTRAMUSCULAR; INTRAVENOUS at 15:01

## 2019-12-02 ASSESSMENT — MIFFLIN-ST. JEOR: SCORE: 1654.88

## 2019-12-02 ASSESSMENT — PAIN SCALES - GENERAL: PAINLEVEL: MODERATE PAIN (5)

## 2019-12-02 NOTE — NURSING NOTE
"Chief Complaint   Patient presents with     Throat Problem     Patient is here for pain in the throat that started 5 days ago and a fever 2 days ago. Patient states she has been trying Dayquil, Nyquil and Tylenol.    Initial /80   Pulse 105   Temp 98.9  F (37.2  C) (Tympanic)   Resp 18   Ht 1.575 m (5' 2\")   Wt 96.2 kg (212 lb)   SpO2 98%   BMI 38.78 kg/m   Estimated body mass index is 38.78 kg/m  as calculated from the following:    Height as of this encounter: 1.575 m (5' 2\").    Weight as of this encounter: 96.2 kg (212 lb).  Medication Reconciliation: complete    Sil Weir LPN  "

## 2019-12-02 NOTE — PROGRESS NOTES
HPI:    Iris Negron is a 24 year old female  who presents to clinic today with father for strep testing.    Sore throat x 5 days.  Very painful to swallow.  Laryngitis x 2 days.  Intermittent sweats and feeling feverish for the past week, no documented fevers.  No chills.  Chronic headaches.  No ear pain.  No runny or stuffy nose.  Dry cough with occasional production.  No chest congestion.  No increased chest tightness, states always some tightness due to asthma.  No increased shortness of breath - hx of asthma.  No change in appetite.  Decreased energy.      Taking Tylenol, Nyquil, and Dayquil.  Using cough drops/lozenges.        Past Medical History:   Diagnosis Date     Acne     No Comments Provided     Major depressive disorder, single episode     No Comments Provided     Uncomplicated asthma     No Comments Provided     Past Surgical History:   Procedure Laterality Date     APPENDECTOMY OPEN      2001     EXTRACTION(S) DENTAL      2011     Social History     Tobacco Use     Smoking status: Former Smoker     Smokeless tobacco: Never Used     Tobacco comment: Quit smoking: smokes ecigs/vaping   Substance Use Topics     Alcohol use: Yes     Alcohol/week: 0.0 standard drinks     Comment: occasionally     Current Outpatient Medications   Medication Sig Dispense Refill     albuterol (PROAIR HFA/PROVENTIL HFA/VENTOLIN HFA) 108 (90 BASE) MCG/ACT Inhaler Inhale 2 puffs into the lungs 4 times daily as needed       amitriptyline (ELAVIL) 25 MG tablet Take 25 mg by mouth       diphenhydrAMINE (BENADRYL) 25 MG capsule Take 25 mg by mouth every 4 hours as needed       loratadine (CLARITIN) 10 MG tablet Take 10 mg by mouth daily       sertraline (ZOLOFT) 25 MG tablet One daily       EPINEPHrine (EPIPEN/ADRENACLICK/OR ANY BX GENERIC EQUIV) 0.3 MG/0.3ML injection 2-pack Inject 0.3 mg into the muscle once       phenylephrine (CHAN-SYNEPHRINE) 0.5 % spray Spray 1 drop into both nostrils every 4 hours as needed for congestion  "(Patient not taking: Reported on 12/2/2019) 15 mL 0     Allergies   Allergen Reactions     Food Anaphylaxis     Strawberries, melon,horshradish,mushrooms     No Clinical Screening - See Comments Anaphylaxis and Hives     Melons, horse radish, mushrooms     Strawberry Anaphylaxis and Hives     Aspirin Other (See Comments)     Dizzy     Buffered Aspirin      Gluten Meal Diarrhea     Iodine Hives     Latex Hives     Metronidazole      Muscle spasms in neck.     Oxycodone-Acetaminophen Hives     Procaine Nausea and Vomiting     Seasonal          Past medical history, past surgical history, current medications and allergies reviewed and accurate to the best of my knowledge.        ROS:  Refer to HPI    /80   Pulse 105   Temp 98.9  F (37.2  C) (Tympanic)   Resp 18   Ht 1.575 m (5' 2\")   Wt 96.2 kg (212 lb)   SpO2 98%   BMI 38.78 kg/m      EXAM:  General Appearance: Well appearing adult female, appropriate appearance for age. No acute distress  Ears: Left TM grey, translucent with bony landmarks appreciated, no erythema, no effusion, no bulging, no purulence.  Right TM grey, translucent with bony landmarks appreciated, no erythema, no effusion, no bulging, no purulence.  Left auditory canal clear.  Right auditory canal clear.  Normal external ears, non tender.  Eyes: conjunctivae normal without erythema or irritation, no drainage or crusting, no eyelid swelling, pupils equal   Orophayrnx: moist mucous membranes, posterior pharynx with mild erythema, tonsils with 2+ hypertrophy, mild erythematous, no exudates or petechiae, no post nasal drip seen, no trismus, voice clear.   Nose:  no drainage or congestion noted  Neck: no palpable lymph nodes but generalized tenderness to palpation  Respiratory: normal chest wall and respirations.  Normal effort.  Clear to auscultation bilaterally, no wheezing, crackles or rhonchi.  No increased work of breathing.  No cough appreciated, oxygen saturation 98%  Cardiac: RRR with " no murmurs  Musculoskeletal:  Equal movement of bilateral upper extremities.  Equal movement of bilateral lower extremities.  Normal gait.    Psychological: normal affect, alert and pleasant      Labs:  Results for orders placed or performed in visit on 12/02/19   Group A Streptococcus PCR Throat Swab     Status: None   Result Value Ref Range    Specimen Description Throat     Strep Group A PCR Not Detected NDET^Not Detected             ASSESSMENT/PLAN:  1. Throat pain    - Group A Streptococcus PCR Throat Swab    - dexamethasone (DECADRON) oral solution (inj used orally) 12 mg oral x 1 administered in clinic     2. Acute viral pharyngitis    Negative strep PCR test     Discussed with patient viral vs bacterial respiratory illness.  Discussed with patient that symptoms and exam are consistent with viral illness.        Symptomatic treatment - Encouraged fluids, salt water gargles, honey, elevation, humidifier, lozenges, popsicles, tea, soup, throat spray, etc     May use over-the-counter Tylenol or ibuprofen PRN    Discussed warning signs/symptoms indicative of need to f/u    Follow up if symptoms persist or worsen or concerns      Disclaimer:  This note consists of words and symbols derived from keyboarding, dictation, or using voice recognition software. As a result, there may be errors in the script that have gone undetected. Please consider this when interpreting information found in this note.

## 2020-03-11 ENCOUNTER — HEALTH MAINTENANCE LETTER (OUTPATIENT)
Age: 25
End: 2020-03-11

## 2020-12-27 ENCOUNTER — HEALTH MAINTENANCE LETTER (OUTPATIENT)
Age: 25
End: 2020-12-27

## 2021-04-25 ENCOUNTER — HEALTH MAINTENANCE LETTER (OUTPATIENT)
Age: 26
End: 2021-04-25

## 2021-10-09 ENCOUNTER — HEALTH MAINTENANCE LETTER (OUTPATIENT)
Age: 26
End: 2021-10-09

## 2021-11-25 ENCOUNTER — HOSPITAL ENCOUNTER (EMERGENCY)
Facility: OTHER | Age: 26
Discharge: HOME | End: 2021-11-25
Attending: PHYSICIAN ASSISTANT | Admitting: PHYSICIAN ASSISTANT
Payer: COMMERCIAL

## 2021-11-25 VITALS
SYSTOLIC BLOOD PRESSURE: 114 MMHG | HEART RATE: 77 BPM | OXYGEN SATURATION: 99 % | DIASTOLIC BLOOD PRESSURE: 75 MMHG | BODY MASS INDEX: 40.3 KG/M2 | RESPIRATION RATE: 20 BRPM | TEMPERATURE: 97.7 F | HEIGHT: 62 IN | WEIGHT: 219 LBS

## 2021-11-25 DIAGNOSIS — H92.09 EAR PAIN: ICD-10-CM

## 2021-11-25 DIAGNOSIS — U07.1 INFECTION DUE TO 2019 NOVEL CORONAVIRUS: ICD-10-CM

## 2021-11-25 LAB
ALBUMIN SERPL-MCNC: 4.1 G/DL (ref 3.5–5.7)
ALBUMIN UR-MCNC: 50 MG/DL
ALP SERPL-CCNC: 52 U/L (ref 34–104)
ALT SERPL W P-5'-P-CCNC: 16 U/L (ref 7–52)
ANION GAP SERPL CALCULATED.3IONS-SCNC: 9 MMOL/L (ref 3–14)
APPEARANCE UR: ABNORMAL
AST SERPL W P-5'-P-CCNC: 18 U/L (ref 13–39)
BACTERIA #/AREA URNS HPF: ABNORMAL /HPF
BASOPHILS # BLD AUTO: 0 10E3/UL (ref 0–0.2)
BASOPHILS NFR BLD AUTO: 0 %
BILIRUB DIRECT SERPL-MCNC: 0.1 MG/DL (ref 0–0.2)
BILIRUB SERPL-MCNC: 0.3 MG/DL (ref 0.3–1)
BILIRUB UR QL STRIP: NEGATIVE
BUN SERPL-MCNC: 10 MG/DL (ref 7–25)
CALCIUM SERPL-MCNC: 9 MG/DL (ref 8.6–10.3)
CHLORIDE BLD-SCNC: 105 MMOL/L (ref 98–107)
CO2 SERPL-SCNC: 24 MMOL/L (ref 21–31)
COLOR UR AUTO: ABNORMAL
CREAT SERPL-MCNC: 0.92 MG/DL (ref 0.6–1.2)
EOSINOPHIL # BLD AUTO: 0 10E3/UL (ref 0–0.7)
EOSINOPHIL NFR BLD AUTO: 1 %
ERYTHROCYTE [DISTWIDTH] IN BLOOD BY AUTOMATED COUNT: 12.8 % (ref 10–15)
FLUAV RNA SPEC QL NAA+PROBE: NEGATIVE
FLUBV RNA RESP QL NAA+PROBE: NEGATIVE
GFR SERPL CREATININE-BSD FRML MDRD: 85 ML/MIN/1.73M2
GLUCOSE BLD-MCNC: 106 MG/DL (ref 70–105)
GLUCOSE UR STRIP-MCNC: NEGATIVE MG/DL
HCG SERPL QL: NEGATIVE
HCG UR QL: NEGATIVE
HCT VFR BLD AUTO: 39.6 % (ref 35–47)
HGB BLD-MCNC: 13.1 G/DL (ref 11.7–15.7)
HGB UR QL STRIP: NEGATIVE
HYALINE CASTS: 8 /LPF
IMM GRANULOCYTES # BLD: 0 10E3/UL
IMM GRANULOCYTES NFR BLD: 0 %
KETONES UR STRIP-MCNC: NEGATIVE MG/DL
LEUKOCYTE ESTERASE UR QL STRIP: ABNORMAL
LYMPHOCYTES # BLD AUTO: 1.3 10E3/UL (ref 0.8–5.3)
LYMPHOCYTES NFR BLD AUTO: 22 %
MAGNESIUM SERPL-MCNC: 2.1 MG/DL (ref 1.9–2.7)
MCH RBC QN AUTO: 30.5 PG (ref 26.5–33)
MCHC RBC AUTO-ENTMCNC: 33.1 G/DL (ref 31.5–36.5)
MCV RBC AUTO: 92 FL (ref 78–100)
MONOCYTES # BLD AUTO: 0.4 10E3/UL (ref 0–1.3)
MONOCYTES NFR BLD AUTO: 6 %
MUCOUS THREADS #/AREA URNS LPF: PRESENT /LPF
NEUTROPHILS # BLD AUTO: 4 10E3/UL (ref 1.6–8.3)
NEUTROPHILS NFR BLD AUTO: 71 %
NITRATE UR QL: NEGATIVE
NRBC # BLD AUTO: 0 10E3/UL
NRBC BLD AUTO-RTO: 0 /100
PH UR STRIP: 5.5 [PH] (ref 5–9)
PLATELET # BLD AUTO: 201 10E3/UL (ref 150–450)
POTASSIUM BLD-SCNC: 4.1 MMOL/L (ref 3.5–5.1)
PROT SERPL-MCNC: 6.7 G/DL (ref 6.4–8.9)
RBC # BLD AUTO: 4.29 10E6/UL (ref 3.8–5.2)
RBC URINE: 7 /HPF
RSV RNA SPEC NAA+PROBE: NEGATIVE
SARS-COV-2 RNA RESP QL NAA+PROBE: POSITIVE
SODIUM SERPL-SCNC: 138 MMOL/L (ref 134–144)
SP GR UR STRIP: 1.03 (ref 1–1.03)
SQUAMOUS EPITHELIAL: 15 /HPF
UROBILINOGEN UR STRIP-MCNC: NORMAL MG/DL
WBC # BLD AUTO: 5.8 10E3/UL (ref 4–11)
WBC URINE: 20 /HPF

## 2021-11-25 PROCEDURE — 87637 SARSCOV2&INF A&B&RSV AMP PRB: CPT | Performed by: PHYSICIAN ASSISTANT

## 2021-11-25 PROCEDURE — 81001 URINALYSIS AUTO W/SCOPE: CPT | Performed by: PHYSICIAN ASSISTANT

## 2021-11-25 PROCEDURE — 99284 EMERGENCY DEPT VISIT MOD MDM: CPT | Mod: 25 | Performed by: PHYSICIAN ASSISTANT

## 2021-11-25 PROCEDURE — 81025 URINE PREGNANCY TEST: CPT | Performed by: PHYSICIAN ASSISTANT

## 2021-11-25 PROCEDURE — 87086 URINE CULTURE/COLONY COUNT: CPT | Performed by: PHYSICIAN ASSISTANT

## 2021-11-25 PROCEDURE — 36415 COLL VENOUS BLD VENIPUNCTURE: CPT | Performed by: PHYSICIAN ASSISTANT

## 2021-11-25 PROCEDURE — 250N000011 HC RX IP 250 OP 636: Performed by: PHYSICIAN ASSISTANT

## 2021-11-25 PROCEDURE — 82248 BILIRUBIN DIRECT: CPT | Performed by: PHYSICIAN ASSISTANT

## 2021-11-25 PROCEDURE — 80053 COMPREHEN METABOLIC PANEL: CPT | Performed by: PHYSICIAN ASSISTANT

## 2021-11-25 PROCEDURE — 96374 THER/PROPH/DIAG INJ IV PUSH: CPT | Performed by: PHYSICIAN ASSISTANT

## 2021-11-25 PROCEDURE — 99283 EMERGENCY DEPT VISIT LOW MDM: CPT | Performed by: PHYSICIAN ASSISTANT

## 2021-11-25 PROCEDURE — 84703 CHORIONIC GONADOTROPIN ASSAY: CPT | Performed by: PHYSICIAN ASSISTANT

## 2021-11-25 PROCEDURE — 85025 COMPLETE CBC W/AUTO DIFF WBC: CPT | Performed by: PHYSICIAN ASSISTANT

## 2021-11-25 PROCEDURE — 258N000003 HC RX IP 258 OP 636: Performed by: PHYSICIAN ASSISTANT

## 2021-11-25 PROCEDURE — 83735 ASSAY OF MAGNESIUM: CPT | Performed by: PHYSICIAN ASSISTANT

## 2021-11-25 PROCEDURE — C9803 HOPD COVID-19 SPEC COLLECT: HCPCS | Performed by: PHYSICIAN ASSISTANT

## 2021-11-25 RX ORDER — ONDANSETRON 4 MG/1
4 TABLET, ORALLY DISINTEGRATING ORAL EVERY 8 HOURS PRN
Qty: 5 TABLET | Refills: 0 | Status: SHIPPED | OUTPATIENT
Start: 2021-11-25 | End: 2022-11-27

## 2021-11-25 RX ORDER — ONDANSETRON 2 MG/ML
4 INJECTION INTRAMUSCULAR; INTRAVENOUS ONCE
Status: COMPLETED | OUTPATIENT
Start: 2021-11-25 | End: 2021-11-25

## 2021-11-25 RX ADMIN — SODIUM CHLORIDE 1000 ML: 9 INJECTION, SOLUTION INTRAVENOUS at 13:25

## 2021-11-25 RX ADMIN — ONDANSETRON 4 MG: 2 INJECTION INTRAMUSCULAR; INTRAVENOUS at 14:31

## 2021-11-25 ASSESSMENT — MIFFLIN-ST. JEOR: SCORE: 1676.63

## 2021-11-25 NOTE — ED TRIAGE NOTES
"ED Nursing Triage Note (General)   ________________________________    Iris JOSEPH Negron is a 26 year old Female that presents to triage private car  With history of  Having bilateral ear pain that has been going on \"for Awhile but has gotten worse\". Pt says her right ear is the worse. Pt has not been able to sleep, says she is dizzy, and is nauseated.    /83   Pulse 94   Temp 97.7  F (36.5  C) (Tympanic)   Resp 20   Ht 1.575 m (5' 2\")   Wt 99.3 kg (219 lb)   SpO2 96%   BMI 40.06 kg/m  t  Patient appears alert  and oriented, in no acute distress., and cooperative, pleasant and calm behavior.  GCS Total = 15  Airway: intact  Breathing noted as Normal  Circulation Normal  Skin:  Normal  Action taken:  Triage to critical care immediately      [facility]  home  "

## 2021-11-25 NOTE — DISCHARGE INSTRUCTIONS
Get plenty of fluids and rest.  You can alternate Tylenol and ibuprofen help with fever and comfort control.  As discussed you are positive for Covid today which may explain a lot of your symptoms.  You can take your antinausea medication as needed.  We are culturing your urine and if it grows out bacteria you will be contacted with an antibiotic treatment.  Return if you have worsening or concerning symptoms especially increased shortness of breath.  Referral is placed for you to follow-up with ear nose and throat specialist.

## 2021-11-26 LAB — BACTERIA UR CULT: NORMAL

## 2021-11-28 ASSESSMENT — ENCOUNTER SYMPTOMS
ABDOMINAL PAIN: 0
VOMITING: 0
FEVER: 0
CONFUSION: 0
FATIGUE: 1
WOUND: 0
BACK PAIN: 0
HEMATURIA: 0
CHEST TIGHTNESS: 0
BRUISES/BLEEDS EASILY: 0
NAUSEA: 1
SHORTNESS OF BREATH: 0
CHILLS: 0

## 2021-11-28 NOTE — ED PROVIDER NOTES
"  History     Chief Complaint   Patient presents with     Otitis Media     HPI  Iris R Migdalia is a 26 year old female who presents to the ED for ear pain, body aches. history of  Having bilateral ear pain that has been going on \"for Awhile but has gotten worse\". Pt says her right ear is the worse. Pt has not been able to sleep, says she is dizzy, and is nauseated.      Allergies:  Allergies   Allergen Reactions     Food Anaphylaxis     Strawberries, melon,horshradish,mushrooms     Iodine Hives     Latex Hives     No Clinical Screening - See Comments Anaphylaxis and Hives     Melons, horse radish, mushrooms     Oxycodone-Acetaminophen Hives     Strawberry Anaphylaxis and Hives     Aspirin Other (See Comments)     Dizzy     Buffered Aspirin      Gluten Meal Diarrhea     Metronidazole      Muscle spasms in neck.     Procaine Nausea and Vomiting     Seasonal        Problem List:    Patient Active Problem List    Diagnosis Date Noted     Dysmenorrhea 02/09/2018     Priority: Medium     Irregular menses 02/09/2018     Priority: Medium     Allergic rhinitis 02/09/2018     Priority: Medium     Overview:   Intermittent, seasonal       Asthma 02/09/2018     Priority: Medium     Allergy 08/03/2016     Priority: Medium     Overview:   Strawberries and melons       Chronic tension-type headache, not intractable 08/03/2016     Priority: Medium     Gastroenteritis 12/28/2012     Priority: Medium     Dysthymic disorder 03/29/2011     Priority: Medium     Obesity 02/16/2010     Priority: Medium     Acne, mild 02/08/2008     Priority: Medium        Past Medical History:    Past Medical History:   Diagnosis Date     Acne      Major depressive disorder, single episode      Uncomplicated asthma        Past Surgical History:    Past Surgical History:   Procedure Laterality Date     APPENDECTOMY OPEN      2001     EXTRACTION(S) DENTAL      2011       Family History:    Family History   Problem Relation Age of Onset     Other - See " Comments Mother         Didelphic uterus/menorrhagia/hysterectomy at age 25     Substance Abuse Mother         Alcohol/Drug,CD     Other - See Comments Paternal Grandmother         Psychiatric illness,Mental illness     Breast Cancer Maternal Grandmother         Cancer-breast     Diabetes Maternal Grandmother         Diabetes     Hypertension Maternal Grandmother         Hypertension     Diabetes Maternal Aunt         Diabetes     Diabetes Maternal Grandfather         Diabetes     Hypertension Maternal Grandfather         Hypertension     Hypertension Maternal Aunt         Hypertension     Substance Abuse Maternal Uncle         Alcohol/Drug     Substance Abuse Maternal Uncle         Alcohol/Drug       Social History:  Marital Status:  Single [1]  Social History     Tobacco Use     Smoking status: Former Smoker     Smokeless tobacco: Never Used     Tobacco comment: Quit smoking: smokes ecigs/vaping   Substance Use Topics     Alcohol use: Yes     Alcohol/week: 0.0 standard drinks     Comment: occasionally     Drug use: No     Comment: Drug use: No        Medications:    ondansetron (ZOFRAN-ODT) 4 MG ODT tab  albuterol (PROAIR HFA/PROVENTIL HFA/VENTOLIN HFA) 108 (90 BASE) MCG/ACT Inhaler  amitriptyline (ELAVIL) 25 MG tablet  diphenhydrAMINE (BENADRYL) 25 MG capsule  EPINEPHrine (EPIPEN/ADRENACLICK/OR ANY BX GENERIC EQUIV) 0.3 MG/0.3ML injection 2-pack  loratadine (CLARITIN) 10 MG tablet  phenylephrine (CHAN-SYNEPHRINE) 0.5 % spray  sertraline (ZOLOFT) 25 MG tablet          Review of Systems   Constitutional: Positive for fatigue. Negative for chills and fever.   HENT: Positive for ear pain. Negative for congestion.    Eyes: Negative for visual disturbance.   Respiratory: Negative for chest tightness and shortness of breath.    Cardiovascular: Negative for chest pain.   Gastrointestinal: Positive for nausea. Negative for abdominal pain and vomiting.   Genitourinary: Negative for hematuria.   Musculoskeletal: Negative for  "back pain.   Skin: Negative for rash and wound.   Neurological: Negative for syncope.   Hematological: Does not bruise/bleed easily.   Psychiatric/Behavioral: Negative for confusion.       Physical Exam   BP: 129/83  Pulse: 94  Temp: 97.7  F (36.5  C)  Resp: 20  Height: 157.5 cm (5' 2\")  Weight: 99.3 kg (219 lb)  SpO2: 96 %      Physical Exam  Constitutional:       General: She is not in acute distress.     Appearance: She is well-developed. She is not diaphoretic.   HENT:      Head: Normocephalic and atraumatic.      Right Ear: Tympanic membrane normal.      Left Ear: Tympanic membrane normal.   Eyes:      General: No scleral icterus.     Extraocular Movements: Extraocular movements intact.      Conjunctiva/sclera: Conjunctivae normal.      Pupils: Pupils are equal, round, and reactive to light.   Cardiovascular:      Rate and Rhythm: Normal rate and regular rhythm.   Pulmonary:      Effort: Pulmonary effort is normal.      Breath sounds: Normal breath sounds.   Abdominal:      Palpations: Abdomen is soft.      Tenderness: There is no abdominal tenderness.   Musculoskeletal:         General: No deformity.      Cervical back: Neck supple.   Lymphadenopathy:      Cervical: No cervical adenopathy.   Skin:     General: Skin is warm and dry.      Coloration: Skin is not jaundiced.      Findings: No rash.   Neurological:      General: No focal deficit present.      Mental Status: She is alert and oriented to person, place, and time. Mental status is at baseline.      Cranial Nerves: No cranial nerve deficit.      Sensory: No sensory deficit.      Motor: No weakness.      Coordination: Coordination normal.   Psychiatric:         Mood and Affect: Mood normal.         Behavior: Behavior normal.         Thought Content: Thought content normal.         Judgment: Judgment normal.         ED Course        Procedures              Critical Care time:  none               No results found for this or any previous visit (from the " past 24 hour(s)).    Medications   0.9% sodium chloride BOLUS (0 mLs Intravenous Stopped 11/25/21 1509)   ondansetron (ZOFRAN) injection 4 mg (4 mg Intravenous Given 11/25/21 1431)       Assessments & Plan (with Medical Decision Making)   Pt nontoxic in NAD. Heart, lung, bowel sounds normal, abd soft, nontender to palpation, nondistended. VSS, afebrile.     Bilateral ear exam normal. She does appear nauseous.     She is given fluids, zofran.    Lab work appears stable, culturing UA. Positive for COVID.     Overall, she is doing well. Certainly the sx's she is experiencing can be connected to her COVID illness. However, I will place referral to f/u with pcp/ENT in ~1 week for reassessment.     She will continue with conservative treatment.     Strict return precautions are given to the pt, they will return if symptoms are worsening or concerning. The pt understands and agrees with the plan and they are discharged.     Paras Cantor PA-C    I have reviewed the nursing notes.    I have reviewed the findings, diagnosis, plan and need for follow up with the patient.       Discharge Medication List as of 11/25/2021  3:10 PM      START taking these medications    Details   ondansetron (ZOFRAN-ODT) 4 MG ODT tab Take 1 tablet (4 mg) by mouth every 8 hours as needed for nausea, Disp-5 tablet, R-0, [facility]             Final diagnoses:   Ear pain   Infection due to 2019 novel coronavirus       11/25/2021   [facility] AND Paras Stephenson PA  11/28/21 3591

## 2022-05-21 ENCOUNTER — HEALTH MAINTENANCE LETTER (OUTPATIENT)
Age: 27
End: 2022-05-21

## 2022-09-17 ENCOUNTER — HEALTH MAINTENANCE LETTER (OUTPATIENT)
Age: 27
End: 2022-09-17

## 2022-11-27 ENCOUNTER — OFFICE VISIT (OUTPATIENT)
Dept: FAMILY MEDICINE | Facility: OTHER | Age: 27
End: 2022-11-27
Attending: PHYSICIAN ASSISTANT
Payer: COMMERCIAL

## 2022-11-27 VITALS
HEART RATE: 70 BPM | OXYGEN SATURATION: 100 % | BODY MASS INDEX: 43.67 KG/M2 | HEIGHT: 62 IN | TEMPERATURE: 99.5 F | SYSTOLIC BLOOD PRESSURE: 130 MMHG | DIASTOLIC BLOOD PRESSURE: 72 MMHG | RESPIRATION RATE: 20 BRPM | WEIGHT: 237.3 LBS

## 2022-11-27 DIAGNOSIS — H00.012 HORDEOLUM EXTERNUM OF RIGHT LOWER EYELID: Primary | ICD-10-CM

## 2022-11-27 PROCEDURE — G0463 HOSPITAL OUTPT CLINIC VISIT: HCPCS

## 2022-11-27 PROCEDURE — 99202 OFFICE O/P NEW SF 15 MIN: CPT | Performed by: NURSE PRACTITIONER

## 2022-11-27 RX ORDER — FLUTICASONE PROPIONATE 110 UG/1
2 AEROSOL, METERED RESPIRATORY (INHALATION) 2 TIMES DAILY
COMMUNITY
Start: 2022-06-28 | End: 2024-03-10

## 2022-11-27 RX ORDER — ESCITALOPRAM OXALATE 20 MG/1
20 TABLET ORAL DAILY
COMMUNITY
Start: 2022-08-16

## 2022-11-27 RX ORDER — ERYTHROMYCIN 5 MG/G
0.5 OINTMENT OPHTHALMIC 4 TIMES DAILY
Qty: 14 G | Refills: 0 | Status: SHIPPED | OUTPATIENT
Start: 2022-11-27 | End: 2022-12-04

## 2022-11-27 ASSESSMENT — ENCOUNTER SYMPTOMS
FEVER: 0
CHILLS: 0
HEMATURIA: 0
EYE ITCHING: 1
PHOTOPHOBIA: 0
EYE DISCHARGE: 1
ABDOMINAL PAIN: 0
EYE REDNESS: 1
BRUISES/BLEEDS EASILY: 0
CHEST TIGHTNESS: 0
EYE PAIN: 1
CONFUSION: 0
WOUND: 0
BACK PAIN: 0
SHORTNESS OF BREATH: 0

## 2022-11-27 ASSESSMENT — ASTHMA QUESTIONNAIRES: ACT_TOTALSCORE: 15

## 2022-11-27 ASSESSMENT — VISUAL ACUITY: OU: 1

## 2022-11-27 ASSESSMENT — PAIN SCALES - GENERAL: PAINLEVEL: SEVERE PAIN (6)

## 2022-11-27 NOTE — NURSING NOTE
Pt here for right eye pain since yesterday.  Woke up this morning with a little more crust than normal.  She didn't get anything in or injured it.  Kylee Jules CMA ([facility])......................11/27/2022  2:01 PM      Medication Reconciliation: complete    Kylee Jules CMA  11/27/2022 2:01 PM

## 2022-11-27 NOTE — PROGRESS NOTES
Assessment & Plan   Iris Negron is a 27 year old, presenting for the following health issues:      ICD-10-CM    1. Hordeolum externum of right lower eyelid  H00.012 erythromycin (ROMYCIN) 5 MG/GM ophthalmic ointment        Vital signs stable. Physical exam consistent with right lower eylid stye.     -Eyeointment as prescribed  -In regards to antibiotic drops/ointment, please use as directed (wash hands before putting in eye).   -Avoid touching the eye, as conjunctivitis/pink eye, is very contagious.   -Wash your hands regularly before touching your eye, if you must touch it. Wash your pillow case daily or every other day until symptoms clear up.   -Do not share cosmetics, eye drops or contacts with other individuals.   -Warm compresses are recommended as needed.     UpToDate consulted for best practices and current guidelines for treatment of visit diagnoses.      Discussed signs/ symptoms that would warrant urgent/ emergent follow-up. Patient in agreement with plan. AVS reviewed with patient. All questions and concerns addressed this visit.       Encouraged weight loss and regular exercise.     Return if symptoms worsen or fail to improve, for Return as needed for new or worsening symptoms.    ARNAUD Mortensen CNP  [facility]        Subjective   Iris is a 27 year old, presenting for the following health issues:  Eye Problem (Right eye pain)      Patient presents with  right eye pain since yesterday.  Woke up this morning with a little more crust than normal.  She reports that she did not get anything in it or injured it.             Review of Systems   Constitutional: Negative for chills and fever.   HENT: Negative for congestion.    Eyes: Positive for pain, discharge, redness and itching. Negative for photophobia and visual disturbance.   Respiratory: Negative for chest tightness and shortness of breath.    Cardiovascular: Negative for chest pain.   Gastrointestinal: Negative for  "abdominal pain.   Genitourinary: Negative for hematuria.   Musculoskeletal: Negative for back pain.   Skin: Negative for rash and wound.   Neurological: Negative for syncope.   Hematological: Does not bruise/bleed easily.   Psychiatric/Behavioral: Negative for confusion.            Objective    /72 (BP Location: Left arm, Patient Position: Sitting, Cuff Size: Adult Large)   Pulse 70   Temp 99.5  F (37.5  C) (Tympanic)   Resp 20   Ht 1.568 m (5' 1.75\")   Wt 107.6 kg (237 lb 4.8 oz)   LMP 11/22/2022 (Exact Date)   SpO2 100%   BMI 43.75 kg/m    Body mass index is 43.75 kg/m .  Physical Exam  Vitals and nursing note reviewed.   Constitutional:       General: She is not in acute distress.     Appearance: She is well-developed. She is not diaphoretic.   HENT:      Head: Normocephalic and atraumatic.   Eyes:      General: Lids are everted, no foreign bodies appreciated. Vision grossly intact. Gaze aligned appropriately. No scleral icterus.        Right eye: Hordeolum present.         Left eye: No foreign body, discharge or hordeolum.      Extraocular Movements: Extraocular movements intact.      Conjunctiva/sclera: Conjunctivae normal.      Right eye: Right conjunctiva is not injected. No chemosis, exudate or hemorrhage.     Left eye: Left conjunctiva is not injected. No chemosis, exudate or hemorrhage.  Cardiovascular:      Rate and Rhythm: Normal rate and regular rhythm.   Pulmonary:      Effort: Pulmonary effort is normal.      Breath sounds: Normal breath sounds.   Abdominal:      Palpations: Abdomen is soft.      Tenderness: There is no abdominal tenderness.   Musculoskeletal:         General: No deformity.      Cervical back: Neck supple.   Lymphadenopathy:      Cervical: No cervical adenopathy.   Skin:     General: Skin is warm and dry.      Coloration: Skin is not jaundiced.      Findings: No rash.   Neurological:      Mental Status: She is alert and oriented to person, place, and time. Mental status " is at baseline.   Psychiatric:         Mood and Affect: Mood normal.         Behavior: Behavior normal.

## 2023-07-30 ENCOUNTER — HEALTH MAINTENANCE LETTER (OUTPATIENT)
Age: 28
End: 2023-07-30

## 2024-03-10 ENCOUNTER — OFFICE VISIT (OUTPATIENT)
Dept: FAMILY MEDICINE | Facility: OTHER | Age: 29
End: 2024-03-10
Payer: COMMERCIAL

## 2024-03-10 VITALS
WEIGHT: 230.5 LBS | HEART RATE: 103 BPM | RESPIRATION RATE: 18 BRPM | HEIGHT: 65 IN | TEMPERATURE: 98.7 F | SYSTOLIC BLOOD PRESSURE: 130 MMHG | DIASTOLIC BLOOD PRESSURE: 74 MMHG | OXYGEN SATURATION: 99 % | BODY MASS INDEX: 38.4 KG/M2

## 2024-03-10 DIAGNOSIS — J01.01 ACUTE RECURRENT MAXILLARY SINUSITIS: Primary | ICD-10-CM

## 2024-03-10 DIAGNOSIS — R51.9 ACUTE INTRACTABLE HEADACHE, UNSPECIFIED HEADACHE TYPE: ICD-10-CM

## 2024-03-10 PROCEDURE — G0463 HOSPITAL OUTPT CLINIC VISIT: HCPCS

## 2024-03-10 PROCEDURE — 99213 OFFICE O/P EST LOW 20 MIN: CPT | Performed by: REGISTERED NURSE

## 2024-03-10 RX ORDER — BUDESONIDE AND FORMOTEROL FUMARATE DIHYDRATE 160; 4.5 UG/1; UG/1
2 AEROSOL RESPIRATORY (INHALATION)
COMMUNITY
Start: 2023-08-11

## 2024-03-10 ASSESSMENT — ENCOUNTER SYMPTOMS
SINUS PRESSURE: 1
SORE THROAT: 1
COUGH: 0
EYE PAIN: 0
SINUS PAIN: 1
RHINORRHEA: 1
MYALGIAS: 0
CHILLS: 0
DIZZINESS: 0
VOMITING: 1
DIARRHEA: 1
ACTIVITY CHANGE: 0
FEVER: 0
HEADACHES: 1
NAUSEA: 1
EYE DISCHARGE: 0

## 2024-03-10 ASSESSMENT — PAIN SCALES - GENERAL: PAINLEVEL: MODERATE PAIN (5)

## 2024-03-10 NOTE — PROGRESS NOTES
Iris Negron  1995    ASSESSMENT/PLAN:   1. Acute recurrent maxillary sinusitis  2. Acute intractable headache, unspecified headache type    Had a history of sinus pressure, headaches and now worsening.  Sinuses very tender on exam, causing patient to wince, pull away.  Due to worsening symptoms.  Will treat with antibiotic therapy as indicated below.  Reviewed additional symptomatic care that she can do at home.  Continue antihistamine, modification and Abena pot as she has been doing.      - amoxicillin-clavulanate (AUGMENTIN) 875-125 MG tablet; Take 1 tablet by mouth 2 times daily for 7 days  Dispense: 14 tablet; Refill: 0           Patient agrees with plan of care and verbalizes understating. AVS printed. Patient education provided verbally and written instructions provided as requested. Patient made aware of emergent signs and symptoms to monitor for and when to seek additional care/follow up.     SUBJECTIVE:   CHIEF COMPLAINT/ REASON FOR VISIT  Patient presents with:  Sinus Problem     HISTORY OF PRESENT ILLNESS  Iris Negron is a pleasant 28 year old female presents to [facility] clinic today with a 10-day history of sinus pressure, headaches.  She is prone to sinus infections.  Her symptoms have progressively been getting worse.  Initial symptoms started within her sinuses.  She denies sore throat, cough, nausea/vomiting or fevers.  Patient uses Claritin daily.  Has history of asthma.  Recent change to Symbicort which she has found extremely helpful with her asthma symptoms.  Has not been on antibiotic recently.  Been using Tylenol and ibuprofen at home for sinus pressure, pain in addition to Sudafed.        I have reviewed the nursing notes.  I have reviewed allergies, medication list, problem list, and past medical history.    REVIEW OF SYSTEMS  Review of Systems   Constitutional:  Negative for activity change, chills and fever.   HENT:  Positive for congestion, ear pain, hearing loss, postnasal  "drip, rhinorrhea, sinus pressure, sinus pain, sneezing and sore throat.    Eyes:  Negative for pain and discharge.   Respiratory:  Negative for cough.    Gastrointestinal:  Positive for diarrhea, nausea and vomiting.   Musculoskeletal:  Negative for myalgias.   Skin:  Negative for rash.   Neurological:  Positive for headaches. Negative for dizziness.        VITAL SIGNS  Vitals:    03/10/24 1133   BP: 130/74   Pulse: 103   Resp: 18   Temp: 98.7  F (37.1  C)   TempSrc: Tympanic   SpO2: 99%   Weight: 104.6 kg (230 lb 8 oz)   Height: 1.657 m (5' 5.25\")      Body mass index is 38.06 kg/m .    OBJECTIVE:   PHYSICAL EXAM  Physical Exam  Constitutional:       General: She is not in acute distress.     Appearance: She is ill-appearing. She is not toxic-appearing.   HENT:      Right Ear: Tympanic membrane normal.      Left Ear: Tympanic membrane normal.      Nose: Congestion present.      Right Sinus: Maxillary sinus tenderness and frontal sinus tenderness present.      Left Sinus: Maxillary sinus tenderness and frontal sinus tenderness present.      Mouth/Throat:      Pharynx: No oropharyngeal exudate or posterior oropharyngeal erythema.   Cardiovascular:      Rate and Rhythm: Normal rate and regular rhythm.   Pulmonary:      Effort: Pulmonary effort is normal.      Breath sounds: Normal breath sounds.   Musculoskeletal:      Cervical back: No tenderness.   Lymphadenopathy:      Cervical: Cervical adenopathy present.   Neurological:      General: No focal deficit present.   Psychiatric:         Mood and Affect: Mood normal.          DIAGNOSTICS  No results found for any visits on 03/10/24.     ARNAUD Murry [facility]  "

## 2024-03-10 NOTE — NURSING NOTE
"Chief Complaint   Patient presents with    Sinus Problem     Aptient here for possible sinus infection x1.5 weeks. She has head and sinus pressure along with nasal congestion.      Initial /74   Pulse 103   Temp 98.7  F (37.1  C) (Tympanic)   Resp 18   Ht 1.657 m (5' 5.25\")   Wt 104.6 kg (230 lb 8 oz)   LMP 03/04/2024   SpO2 99%   BMI 38.06 kg/m   Estimated body mass index is 38.06 kg/m  as calculated from the following:    Height as of this encounter: 1.657 m (5' 5.25\").    Weight as of this encounter: 104.6 kg (230 lb 8 oz).  Medication Review: complete    The next two questions are to help us understand your food security.  If you are feeling you need any assistance in this area, we have resources available to support you today.          3/10/2024   SDOH- Food Insecurity   Within the past 12 months, did you worry that your food would run out before you got money to buy more? N   Within the past 12 months, did the food you bought just not last and you didn t have money to get more? N         Health Care Directive:  Patient does not have a Health Care Directive or Living Will: Discussed advance care planning with patient; however, patient declined at this time.    Carla Mehta LPN      "

## 2024-03-15 ENCOUNTER — OFFICE VISIT (OUTPATIENT)
Dept: FAMILY MEDICINE | Facility: OTHER | Age: 29
End: 2024-03-15
Payer: COMMERCIAL

## 2024-03-15 VITALS
WEIGHT: 223.2 LBS | HEART RATE: 69 BPM | DIASTOLIC BLOOD PRESSURE: 80 MMHG | OXYGEN SATURATION: 97 % | TEMPERATURE: 99.1 F | BODY MASS INDEX: 39.55 KG/M2 | RESPIRATION RATE: 17 BRPM | SYSTOLIC BLOOD PRESSURE: 116 MMHG | HEIGHT: 63 IN

## 2024-03-15 DIAGNOSIS — H65.03 NON-RECURRENT ACUTE SEROUS OTITIS MEDIA OF BOTH EARS: ICD-10-CM

## 2024-03-15 DIAGNOSIS — R42 VERTIGO: Primary | ICD-10-CM

## 2024-03-15 DIAGNOSIS — H92.03 OTALGIA, BILATERAL: ICD-10-CM

## 2024-03-15 DIAGNOSIS — Z87.898 HISTORY OF VERTIGO: ICD-10-CM

## 2024-03-15 DIAGNOSIS — R42 DIZZINESS: ICD-10-CM

## 2024-03-15 PROCEDURE — G0463 HOSPITAL OUTPT CLINIC VISIT: HCPCS

## 2024-03-15 PROCEDURE — 99213 OFFICE O/P EST LOW 20 MIN: CPT | Performed by: NURSE PRACTITIONER

## 2024-03-15 RX ORDER — MECLIZINE HCL 12.5 MG 12.5 MG/1
TABLET ORAL
Qty: 60 TABLET | Refills: 0 | Status: SHIPPED | OUTPATIENT
Start: 2024-03-15

## 2024-03-15 RX ORDER — FLUTICASONE PROPIONATE 50 MCG
1 SPRAY, SUSPENSION (ML) NASAL DAILY
Qty: 9.9 ML | Refills: 1 | Status: SHIPPED | OUTPATIENT
Start: 2024-03-15

## 2024-03-15 ASSESSMENT — ENCOUNTER SYMPTOMS
DIZZINESS: 1
FEVER: 0
LIGHT-HEADEDNESS: 0
SINUS PAIN: 0
PARESTHESIAS: 0
SPEECH DIFFICULTY: 0
SINUS PRESSURE: 0
CHILLS: 0
FATIGUE: 0
NUMBNESS: 0

## 2024-03-15 ASSESSMENT — PATIENT HEALTH QUESTIONNAIRE - PHQ9
SUM OF ALL RESPONSES TO PHQ QUESTIONS 1-9: 0
10. IF YOU CHECKED OFF ANY PROBLEMS, HOW DIFFICULT HAVE THESE PROBLEMS MADE IT FOR YOU TO DO YOUR WORK, TAKE CARE OF THINGS AT HOME, OR GET ALONG WITH OTHER PEOPLE: NOT DIFFICULT AT ALL
SUM OF ALL RESPONSES TO PHQ QUESTIONS 1-9: 0

## 2024-03-15 ASSESSMENT — ASTHMA QUESTIONNAIRES
QUESTION_3 LAST FOUR WEEKS HOW OFTEN DID YOUR ASTHMA SYMPTOMS (WHEEZING, COUGHING, SHORTNESS OF BREATH, CHEST TIGHTNESS OR PAIN) WAKE YOU UP AT NIGHT OR EARLIER THAN USUAL IN THE MORNING: NOT AT ALL
QUESTION_4 LAST FOUR WEEKS HOW OFTEN HAVE YOU USED YOUR RESCUE INHALER OR NEBULIZER MEDICATION (SUCH AS ALBUTEROL): NOT AT ALL
ACT_TOTALSCORE: 25
QUESTION_1 LAST FOUR WEEKS HOW MUCH OF THE TIME DID YOUR ASTHMA KEEP YOU FROM GETTING AS MUCH DONE AT WORK, SCHOOL OR AT HOME: NONE OF THE TIME
QUESTION_2 LAST FOUR WEEKS HOW OFTEN HAVE YOU HAD SHORTNESS OF BREATH: NOT AT ALL
QUESTION_5 LAST FOUR WEEKS HOW WOULD YOU RATE YOUR ASTHMA CONTROL: COMPLETELY CONTROLLED
ACT_TOTALSCORE: 25

## 2024-03-15 ASSESSMENT — PAIN SCALES - GENERAL: PAINLEVEL: SEVERE PAIN (6)

## 2024-03-15 NOTE — NURSING NOTE
"Chief Complaint   Patient presents with    Dizziness    Ear Problem     Right and left ear pressure, pain and some drainage.     Patient states that she came to the rapid clinic last week for a sinus infection and was given Augmentin. Patient states that her congestion has gotten better but her ears are still bothering her.     Initial /80 (BP Location: Left arm, Patient Position: Sitting, Cuff Size: Adult Large)   Pulse 69   Temp 99.1  F (37.3  C) (Temporal)   Resp 17   Ht 1.588 m (5' 2.5\")   Wt 101.2 kg (223 lb 3.2 oz)   LMP 03/04/2024 (Exact Date)   SpO2 97%   Breastfeeding No   BMI 40.17 kg/m   Estimated body mass index is 40.17 kg/m  as calculated from the following:    Height as of this encounter: 1.588 m (5' 2.5\").    Weight as of this encounter: 101.2 kg (223 lb 3.2 oz).       FOOD SECURITY SCREENING QUESTIONS:    The next two questions are to help us understand your food security.  If you are feeling you need any assistance in this area, we have resources available to support you today.    Hunger Vital Signs:  Within the past 12 months we worried whether our food would run out before we got money to buy more. Never  Within the past 12 months the food we bought just didn't last and we didn't have money to get more. Never  Katheryn Reno LPN on 3/15/2024 at 12:58 PM     Katheryn Reno   "

## 2024-03-15 NOTE — LETTER
March 15, 2024      Iris Negron  [address] [facility] [address]        To Whom It May Concern:    Iris Negron  was seen on 3/15/24.  Please excuse her from 3/11/24 until 3/18/24. due to illness.        Sincerely,        ARNAUD Mortensen CNP

## 2024-03-15 NOTE — PROGRESS NOTES
"    Assessment & Plan       ICD-10-CM    1. Vertigo  R42 meclizine (ANTIVERT) 12.5 MG tablet     Physical Therapy  Referral      2. History of vertigo  Z87.898 Physical Therapy  Referral      3. Otalgia, bilateral  H92.03 fluticasone (FLONASE) 50 MCG/ACT nasal spray      4. Non-recurrent acute serous otitis media of both ears  H65.03 fluticasone (FLONASE) 50 MCG/ACT nasal spray      5. Dizziness  R42         Vital signs stable. Physical exam consistent with acute vertigo episode and serous otitis media of bilateral ears.  Iris has a history of vertigo, with her last attack being about \"15 years ago\". She does remember being on meclizine, and states that she had a good response to it. We also discussed that physical therapist can help with vestibular disorders and that I would be happy to place a referral for outpatient physical therapy if Iris would be in agreement.   She verbalized that she would like to see a physical therapist. Referral placed for outpatient physical therapy today. Script also sent for meclizine 12.5 mg tablets, take 1 to 2 tablets PO TID PRN vertigo symptoms.  Iris does have a history of allergy to aspirin, however reports that she has tolerated meclizine in the past without any side effects.    We discussed common causes for serious otitis media including sinus congestion. Iris was recently treated with Augmentin for acute bacterial sinusitis. She reports that her congestion is \"much better\", but \"my ears are still full\". Writer provided information in regard to Flonase. Explain that it is an OTC medication and to instill 1 to 2 sprays into each nostril daily. This will help to drive up the fluid behind your eardrums.    Iris brings forth no additional concerns during today's visit.    UpToDate consulted for best practices and current guidelines for treatment of visit diagnoses.      Thank you for allowing me to participate in your patient's care.    Jacquie Raymond " "ARNAUD, CNP          Return if symptoms worsen or fail to improve.    ARNAUD Mortensen CNP  [facility]       Allergies: Bee venom, Food, Iodine, Latex, No clinical screening - see comments, Oxycodone-acetaminophen, Peanut-containing drug products, Strawberry extract, Aspirin, Aspirin buf(cacarb-mgcarb-mgo), Gluten meal, Metronidazole, Procaine, and Seasonal     Past Medical, Surgical, Family and Social History reviewed: YES.     Medications:   Current Outpatient Medications   Medication Sig Dispense Refill    albuterol (PROAIR HFA/PROVENTIL HFA/VENTOLIN HFA) 108 (90 BASE) MCG/ACT Inhaler Inhale 2 puffs into the lungs 4 times daily as needed      amoxicillin-clavulanate (AUGMENTIN) 875-125 MG tablet Take 1 tablet by mouth 2 times daily for 7 days 14 tablet 0    budesonide-formoterol (SYMBICORT) 160-4.5 MCG/ACT Inhaler Inhale 2 puffs into the lungs two times daily      escitalopram (LEXAPRO) 20 MG tablet Take 20 mg by mouth daily      fluticasone (FLONASE) 50 MCG/ACT nasal spray Spray 1 spray into both nostrils daily 9.9 mL 1    loratadine (CLARITIN) 10 MG tablet Take 10 mg by mouth daily      meclizine (ANTIVERT) 12.5 MG tablet Take 1-2 tablets (12.5- 25 mg) PO TID PRN vertigo 60 tablet 0       Patient presents to rapid clinic with reports of dizziness, and right and left otalgia. She was seen last week and prescribed a course of Augmentin in the setting of acute bacterial sinusitis. Iris reports that her congestion has improved but her ears remain feeling \"full\" and that she feels dizzy.      Review of Systems   Constitutional:  Negative for chills, fatigue and fever.   HENT:  Positive for congestion and ear pain. Negative for sinus pressure and sinus pain.    Allergic/Immunologic: Positive for environmental allergies.   Neurological:  Positive for dizziness. Negative for syncope, speech difficulty, light-headedness, numbness and paresthesias.        History of vertigo   All other systems " "reviewed and are negative.        Physical Exam  Vitals reviewed.   Constitutional:       Appearance: Normal appearance.   HENT:      Right Ear: Ear canal and external ear normal. Tenderness present. No drainage. A middle ear effusion (serous fluid) is present. Tympanic membrane is bulging.      Left Ear: Ear canal and external ear normal. Tenderness present. No drainage. A middle ear effusion (serous fluid) is present. Tympanic membrane is bulging.   Cardiovascular:      Rate and Rhythm: Normal rate and regular rhythm.      Heart sounds: Normal heart sounds.   Pulmonary:      Effort: Pulmonary effort is normal.      Breath sounds: Normal breath sounds.   Musculoskeletal:      Cervical back: Normal range of motion.   Skin:     General: Skin is warm and dry.   Neurological:      Mental Status: She is alert and oriented to person, place, and time.      Sensory: No sensory deficit.      Comments: Patient reports that \" the room is spinning\". She also feels like she is \"on a boat\". She is able to make it from the chair to the exam table independently, however at a slow pace.       Answers submitted by the patient for this visit:  Patient Health Questionnaire (Submitted on 3/15/2024)  If you checked off any problems, how difficult have these problems made it for you to do your work, take care of things at home, or get along with other people?: Not difficult at all  PHQ9 TOTAL SCORE: 0    "

## 2024-05-03 ENCOUNTER — THERAPY VISIT (OUTPATIENT)
Dept: PHYSICAL THERAPY | Facility: OTHER | Age: 29
End: 2024-05-03
Attending: NURSE PRACTITIONER
Payer: COMMERCIAL

## 2024-05-03 ENCOUNTER — TELEPHONE (OUTPATIENT)
Dept: FAMILY MEDICINE | Facility: OTHER | Age: 29
End: 2024-05-03

## 2024-05-03 DIAGNOSIS — Z87.898 HISTORY OF VERTIGO: ICD-10-CM

## 2024-05-03 DIAGNOSIS — R42 VERTIGO: ICD-10-CM

## 2024-05-03 PROCEDURE — 97163 PT EVAL HIGH COMPLEX 45 MIN: CPT | Mod: GP,PN | Performed by: PHYSICAL THERAPIST

## 2024-05-03 PROCEDURE — 97110 THERAPEUTIC EXERCISES: CPT | Mod: GP,PN | Performed by: PHYSICAL THERAPIST

## 2024-05-03 NOTE — TELEPHONE ENCOUNTER
Patient was evaluated in physical therapy this morning for vestibular symptoms.  Evaluation in EMR.      I was hoping you would be willing to create a referral/orders for Iris to be seen by an ENT.  I think Dr. Patricio is still in [place], as an option.  I have found it helpful to see if there are any more options for managing the chronic sinus issues before progressing too far with vestibular therapy.  Once that is determined, we can definitely see her again and reassess any remaining symptoms.     Also, I will be transitioning Iris to another PT here who works more with chronic headaches and migraines.  I will communicate with both patient and PT to see when she would be appropriate to follow up for vestibular reassessment.      Please call with any questions/concerns/comments ([phone]).  Thank you!  Joyce Luna, PT on 5/3/2024 at 10:01 AM
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
## 2024-06-04 ENCOUNTER — THERAPY VISIT (OUTPATIENT)
Dept: PHYSICAL THERAPY | Facility: OTHER | Age: 29
End: 2024-06-04
Attending: NURSE PRACTITIONER
Payer: COMMERCIAL

## 2024-06-04 DIAGNOSIS — R42 VERTIGO: Primary | ICD-10-CM

## 2024-06-04 PROCEDURE — 97112 NEUROMUSCULAR REEDUCATION: CPT | Mod: GP

## 2024-06-12 ENCOUNTER — THERAPY VISIT (OUTPATIENT)
Dept: PHYSICAL THERAPY | Facility: OTHER | Age: 29
End: 2024-06-12
Attending: NURSE PRACTITIONER
Payer: COMMERCIAL

## 2024-06-12 DIAGNOSIS — R42 VERTIGO: Primary | ICD-10-CM

## 2024-06-12 PROCEDURE — 97140 MANUAL THERAPY 1/> REGIONS: CPT | Mod: GP

## 2024-06-12 PROCEDURE — 97112 NEUROMUSCULAR REEDUCATION: CPT | Mod: GP

## 2024-06-19 ENCOUNTER — THERAPY VISIT (OUTPATIENT)
Dept: PHYSICAL THERAPY | Facility: OTHER | Age: 29
End: 2024-06-19
Attending: NURSE PRACTITIONER
Payer: COMMERCIAL

## 2024-06-19 DIAGNOSIS — R42 VERTIGO: Primary | ICD-10-CM

## 2024-06-19 PROCEDURE — 97112 NEUROMUSCULAR REEDUCATION: CPT | Mod: GP

## 2024-06-26 ENCOUNTER — THERAPY VISIT (OUTPATIENT)
Dept: PHYSICAL THERAPY | Facility: OTHER | Age: 29
End: 2024-06-26
Attending: NURSE PRACTITIONER
Payer: COMMERCIAL

## 2024-06-26 DIAGNOSIS — R42 VERTIGO: Primary | ICD-10-CM

## 2024-06-26 PROCEDURE — 97140 MANUAL THERAPY 1/> REGIONS: CPT | Mod: GP

## 2024-07-03 ENCOUNTER — THERAPY VISIT (OUTPATIENT)
Dept: PHYSICAL THERAPY | Facility: OTHER | Age: 29
End: 2024-07-03
Attending: NURSE PRACTITIONER
Payer: COMMERCIAL

## 2024-07-03 DIAGNOSIS — R42 VERTIGO: Primary | ICD-10-CM

## 2024-07-03 PROCEDURE — 97112 NEUROMUSCULAR REEDUCATION: CPT | Mod: GP

## 2024-07-10 ENCOUNTER — THERAPY VISIT (OUTPATIENT)
Dept: PHYSICAL THERAPY | Facility: OTHER | Age: 29
End: 2024-07-10
Attending: NURSE PRACTITIONER
Payer: COMMERCIAL

## 2024-07-10 DIAGNOSIS — R42 VERTIGO: Primary | ICD-10-CM

## 2024-07-10 PROCEDURE — 97112 NEUROMUSCULAR REEDUCATION: CPT | Mod: GP

## 2024-07-10 PROCEDURE — 97140 MANUAL THERAPY 1/> REGIONS: CPT | Mod: GP

## 2024-08-01 ENCOUNTER — THERAPY VISIT (OUTPATIENT)
Dept: PHYSICAL THERAPY | Facility: OTHER | Age: 29
End: 2024-08-01
Attending: NURSE PRACTITIONER
Payer: COMMERCIAL

## 2024-08-01 DIAGNOSIS — R42 VERTIGO: Primary | ICD-10-CM

## 2024-08-01 PROCEDURE — 97112 NEUROMUSCULAR REEDUCATION: CPT | Mod: GP

## 2024-08-01 PROCEDURE — 97140 MANUAL THERAPY 1/> REGIONS: CPT | Mod: GP

## 2024-08-01 NOTE — PROGRESS NOTES
[facility]                                                                                   OUTPATIENT PHYSICAL THERAPY    PLAN OF TREATMENT FOR OUTPATIENT REHABILITATION   Patient's Last Name, First Name, Iris Carrasquillo YOB: 1995   Provider's Name   [facility]   Medical Record No.  [id number]     Onset Date: 02/01/24  Start of Care Date: 05/03/24     Medical Diagnosis:  Vertigo      PT Treatment Diagnosis:  motion sensitivity; dizziness; balance impairment Plan of Treatment  Frequency/Duration: 18 visits/ 3 months    Certification date from (P) 08/04/24 to (P) 10/27/24         See note for plan of treatment details and functional goals     Dorothy Carlin, PT                         I CERTIFY THE NEED FOR THESE SERVICES FURNISHED UNDER        THIS PLAN OF TREATMENT AND WHILE UNDER MY CARE     (Physician attestation of this document indicates review and certification of the therapy plan).              Referring Provider:  Jacquie Raymond    Initial Assessment  See Epic Evaluation- Start of Care Date: 05/03/24 08/01/24 0500   Appointment Info   Signing clinician's name / credentials Dorothy Carlin, PT, CLT, CAPP   Visits Used 8   Medical Diagnosis Vertigo   PT Tx Diagnosis motion sensitivity; dizziness; balance impairment   Progress Note/Certification   Start of Care Date 05/03/24   Onset of illness/injury or Date of Surgery 02/01/24   Therapy Frequency 18 visits   Predicted Duration 3 months   Certification date from 08/04/24   Certification date to 10/27/24   GOALS   PT Goals 2;3   PT Goal 1   Goal Identifier headaches   Goal Description Patient will experience decreased frequency of headaches, 2/week max for increased activity/work tolerance.   Goal Progress headaches have been 0-1 time per week   Target Date 08/03/24   Date Met 08/01/24   PT Goal 2   Goal Identifier sinus pressure   Goal Description Patient will  experience decreased sinus pain/pressure by 50% or more for decreased headaches and dizziness for increased activity/work tolerance.   Goal Progress continues to progress, about 40% improvement   Target Date 08/03/24   PT Goal 3   Goal Identifier dizziness   Goal Description Patient will experience decreased dizziness/wooziness with head and body motion by 50% or more for increased activity/work tolerance and safety.   Goal Progress continues to progress, about 40% improvement   Target Date 08/03/24   Subjective Report   Subjective Report Last week had a massive headache, thinks it was the heat. Hot at work. Limited breaks until this week. Also thinks sodium contributes to her symptoms. About 40% improvement with dizziness and wooziness   Objective Measures   Objective Measures Objective Measure 1;Objective Measure 2;Objective Measure 3;Objective Measure 4   Objective Measure 1   Objective Measure Postural loading   Details limited loading right head; general listening to right head; local listening to right coronal suture.   Objective Measure 2   Objective Measure cranial screen   Details listening at vertex: to right forward short, restricted right coronal suture   Objective Measure 3   Objective Measure myofascial   Details right frontal artery, right external carotid artery   Objective Measure 4   Objective Measure joint   Details clear   Treatment Interventions (PT)   Interventions Therapeutic Procedure/Exercise;Neuromuscular Re-education;Manual Therapy   Therapeutic Procedure/Exercise   Ther Proc 1 Instruction of evaluation results, motion sensitivity, therapy plan of care.  Recommendation to consult with ENT regarding chronic sinus issue, and physical therapist regarding chronic headaches.  Plan to reassess dizziness symptoms after both courses of treatment.   Neuromuscular Re-education   Neuromuscular re-ed of mvmt, balance, coord, kinesthetic sense, posture, proprioception minutes (05511) 15   Neuro Re-ed 1  neuromeningeal manipulation (NMM), muscle energy technique (MET) to restore function and reduce pain   Neuro Re-ed 1 - Details NMM- right coronal suture, temporal balancing   Patient Response/Progress improved loading right head   Manual Therapy   Manual Therapy: Mobilization, MFR, MLD, friction massage minutes (35948) 15   Manual Therapy 1 MFR to restore function and reduce pain   Manual Therapy 1 - Details MFR- with induciton to right frontal artery, external carotid artery, occiptal artery right.   Education   Learner/Method Patient;Listening   Education Comments vestibular system and therapy POC   Plan   Home program anterior chest stretch over towel roll   Plan for next session will recheck in 3-4 weeks.

## 2024-08-14 ENCOUNTER — THERAPY VISIT (OUTPATIENT)
Dept: PHYSICAL THERAPY | Facility: OTHER | Age: 29
End: 2024-08-14
Attending: NURSE PRACTITIONER
Payer: COMMERCIAL

## 2024-08-14 DIAGNOSIS — R42 VERTIGO: Primary | ICD-10-CM

## 2024-08-14 PROCEDURE — 97112 NEUROMUSCULAR REEDUCATION: CPT | Mod: GP

## 2024-09-24 ENCOUNTER — THERAPY VISIT (OUTPATIENT)
Dept: PHYSICAL THERAPY | Facility: OTHER | Age: 29
End: 2024-09-24
Attending: NURSE PRACTITIONER
Payer: COMMERCIAL

## 2024-09-24 DIAGNOSIS — R42 VERTIGO: Primary | ICD-10-CM

## 2024-09-24 PROCEDURE — 97112 NEUROMUSCULAR REEDUCATION: CPT | Mod: GP

## 2024-09-24 PROCEDURE — 97140 MANUAL THERAPY 1/> REGIONS: CPT | Mod: GP

## 2024-09-24 NOTE — PROGRESS NOTES
DISCHARGE  Reason for Discharge: Patient has met all goals.    Equipment Issued: NA    Discharge Plan: Patient to continue home program.    Referring Provider:  Jacquie Raymond           09/24/24 0500   Appointment Info   Signing clinician's name / credentials Dorothy Carlin, PT, CLT, CAPP   Visits Used 10   Medical Diagnosis Vertigo   PT Tx Diagnosis motion sensitivity; dizziness; balance impairment   Progress Note/Certification   Start of Care Date 05/03/24   Onset of illness/injury or Date of Surgery 02/01/24   Therapy Frequency 18 visits   Predicted Duration 3 months   Certification date from 08/04/24   Certification date to 10/27/24   GOALS   PT Goals 2;3   PT Goal 1   Goal Identifier headaches   Goal Description Patient will experience decreased frequency of headaches, 2/week max for increased activity/work tolerance.   Goal Progress headaches have been 0-1 time per week   Target Date 08/03/24   Date Met 08/01/24   PT Goal 2   Goal Identifier sinus pressure   Goal Description Patient will experience decreased sinus pain/pressure by 50% or more for decreased headaches and dizziness for increased activity/work tolerance.   Goal Progress continues to progress, about 40% improvement   Target Date 08/03/24   Date Met 09/24/24   PT Goal 3   Goal Identifier dizziness   Goal Description Patient will experience decreased dizziness/wooziness with head and body motion by 50% or more for increased activity/work tolerance and safety.   Goal Progress continues to progress, about 40% improvement   Target Date 08/03/24   Date Met 09/24/24   Subjective Report   Subjective Report Only getting headache is she doesn't drink enough water or gets overly stressed. No issues with dizziness. Has appt with audiologist next month.   Objective Measures   Objective Measures Objective Measure 1;Objective Measure 2;Objective Measure 3;Objective Measure 4   Objective Measure 1   Objective Measure Postural loading   Details limited loading  left head; general listening to left side of head; local listening to to left petrobasilar suture   Objective Measure 2   Objective Measure cranial screen   Details restricted left petrobasilar suture   Objective Measure 3   Objective Measure myofascial   Details right cervical mm's   Objective Measure 4   Objective Measure joint   Details FRS right C3   Treatment Interventions (PT)   Interventions Therapeutic Procedure/Exercise;Neuromuscular Re-education;Manual Therapy   Therapeutic Procedure/Exercise   Ther Proc 1 Instruction of evaluation results, motion sensitivity, therapy plan of care.  Recommendation to consult with ENT regarding chronic sinus issue, and physical therapist regarding chronic headaches.  Plan to reassess dizziness symptoms after both courses of treatment.   Neuromuscular Re-education   Neuromuscular re-ed of mvmt, balance, coord, kinesthetic sense, posture, proprioception minutes (07903) 20   Neuro Re-ed 1 neuromeningeal manipulation (NMM), muscle energy technique (MET) to restore function and reduce pain   Neuro Re-ed 1 - Details NMM- left petrobasilar suture, temporal balancing; MET- FRS right C3   Patient Response/Progress improved loading left h ead   Manual Therapy   Manual Therapy: Mobilization, MFR, MLD, friction massage minutes (03362) 10   Manual Therapy 1 MFR to restore function and reduce pain   Manual Therapy 1 - Details MFR- right cervical paraspinals, occipital mm's   Education   Learner/Method Patient;Listening   Education Comments vestibular system and therapy POC   Plan   Home program anterior chest stretch over towel roll   Plan for next session discharged

## 2024-12-17 ENCOUNTER — OFFICE VISIT (OUTPATIENT)
Dept: FAMILY MEDICINE | Facility: OTHER | Age: 29
End: 2024-12-17
Attending: NURSE PRACTITIONER
Payer: COMMERCIAL

## 2024-12-17 VITALS
WEIGHT: 200 LBS | BODY MASS INDEX: 36.8 KG/M2 | DIASTOLIC BLOOD PRESSURE: 74 MMHG | OXYGEN SATURATION: 98 % | HEIGHT: 62 IN | TEMPERATURE: 98.9 F | SYSTOLIC BLOOD PRESSURE: 120 MMHG | HEART RATE: 94 BPM | RESPIRATION RATE: 17 BRPM

## 2024-12-17 DIAGNOSIS — F41.9 ANXIETY: ICD-10-CM

## 2024-12-17 DIAGNOSIS — R11.0 NAUSEA: Primary | ICD-10-CM

## 2024-12-17 PROCEDURE — G0463 HOSPITAL OUTPT CLINIC VISIT: HCPCS

## 2024-12-17 RX ORDER — CETIRIZINE HYDROCHLORIDE 10 MG/1
10 TABLET ORAL DAILY
COMMUNITY
Start: 2024-12-02

## 2024-12-17 RX ORDER — HYDROXYZINE HYDROCHLORIDE 10 MG/1
TABLET, FILM COATED ORAL
COMMUNITY
Start: 2024-12-13

## 2024-12-17 RX ORDER — EPINEPHRINE 0.3 MG/.3ML
0.3 INJECTION SUBCUTANEOUS PRN
COMMUNITY
Start: 2024-05-31

## 2024-12-17 RX ORDER — VENLAFAXINE 37.5 MG/1
37.5 TABLET ORAL DAILY
COMMUNITY
Start: 2024-12-13 | End: 2025-02-11

## 2024-12-17 RX ORDER — MONTELUKAST SODIUM 10 MG/1
1 TABLET ORAL AT BEDTIME
COMMUNITY
Start: 2024-05-31

## 2024-12-17 RX ORDER — ONDANSETRON 4 MG/1
4 TABLET, ORALLY DISINTEGRATING ORAL EVERY 8 HOURS PRN
Qty: 30 TABLET | Refills: 0 | Status: SHIPPED | OUTPATIENT
Start: 2024-12-17

## 2024-12-17 RX ORDER — DIPHENHYDRAMINE HCL 25 MG
25 CAPSULE ORAL EVERY 6 HOURS PRN
COMMUNITY

## 2024-12-17 ASSESSMENT — ASTHMA QUESTIONNAIRES
QUESTION_4 LAST FOUR WEEKS HOW OFTEN HAVE YOU USED YOUR RESCUE INHALER OR NEBULIZER MEDICATION (SUCH AS ALBUTEROL): NOT AT ALL
QUESTION_3 LAST FOUR WEEKS HOW OFTEN DID YOUR ASTHMA SYMPTOMS (WHEEZING, COUGHING, SHORTNESS OF BREATH, CHEST TIGHTNESS OR PAIN) WAKE YOU UP AT NIGHT OR EARLIER THAN USUAL IN THE MORNING: NOT AT ALL
ACT_TOTALSCORE: 25
QUESTION_1 LAST FOUR WEEKS HOW MUCH OF THE TIME DID YOUR ASTHMA KEEP YOU FROM GETTING AS MUCH DONE AT WORK, SCHOOL OR AT HOME: NONE OF THE TIME
ACT_TOTALSCORE: 25
QUESTION_2 LAST FOUR WEEKS HOW OFTEN HAVE YOU HAD SHORTNESS OF BREATH: NOT AT ALL
QUESTION_5 LAST FOUR WEEKS HOW WOULD YOU RATE YOUR ASTHMA CONTROL: COMPLETELY CONTROLLED

## 2024-12-17 ASSESSMENT — PATIENT HEALTH QUESTIONNAIRE - PHQ9: SUM OF ALL RESPONSES TO PHQ QUESTIONS 1-9: 0

## 2024-12-17 ASSESSMENT — PAIN SCALES - GENERAL: PAINLEVEL_OUTOF10: MILD PAIN (2)

## 2024-12-17 NOTE — PROGRESS NOTES
ASSESSMENT/PLAN:    I have reviewed the nursing notes.  I have reviewed the findings, diagnosis, plan and need for follow up with the patient.    1. Nausea (Primary)  - ondansetron (ZOFRAN ODT) 4 MG ODT tab; Take 1 tablet (4 mg) by mouth every 8 hours as needed for nausea or vomiting.  Dispense: 30 tablet; Refill: 0    Patient has been experiencing nausea due to recent medication changes as well as increased rest and anxiety.  Will treat her nausea with Zofran as needed.      2. Anxiety    Patient is currently on Effexor and hydroxyzine as needed.  Advised patient to continue taking medications as prescribed and follow-up with mental health provider.      Discussed warning signs/symptoms indicative of need to f/u    Follow up if symptoms persist or worsen or concerns    I explained my diagnostic considerations and recommendations to the patient, who voiced understanding and agreement with the treatment plan. All questions were answered. We discussed potential side effects of any prescribed or recommended therapies, as well as expectations for response to treatments.    ARNAUD Hinkle CNP  12/17/2024  11:02 AM    HPI:    Iris Negron is a 29 year old female who presents [facility] today for concerns of nausea    Patient states that she recently started on 2 medications 4 days ago for her anxiety.  Patient states that she has been dealing with anxiety and depression for some time and over the past month she has had multiple stressors in her life which has caused her stress and anxiety to increase.  She did see a provider in [place] last week and was started on Effexor and hydroxyzine as needed.  She states she has had nausea due to her increased anxiety and her current medications have increased that nausea.  She states she has vomited once.  She is able to keep food and fluids down but has a decreased appetite.  She denies any other symptoms.  She does have a mental health provider that she has  seen in the past that she will be following up with.    Past Medical History:   Diagnosis Date    Acne     No Comments Provided    Major depressive disorder, single episode     No Comments Provided    Uncomplicated asthma     No Comments Provided     Past Surgical History:   Procedure Laterality Date    APPENDECTOMY OPEN      2001    EXTRACTION(S) DENTAL      2011     Social History     Tobacco Use    Smoking status: Former    Smokeless tobacco: Never    Tobacco comments:     Quit smoking: smokes ecigs/vaping   Substance Use Topics    Alcohol use: Yes     Alcohol/week: 0.0 standard drinks of alcohol     Comment: occasionally     Current Outpatient Medications   Medication Sig Dispense Refill    albuterol (PROAIR HFA/PROVENTIL HFA/VENTOLIN HFA) 108 (90 BASE) MCG/ACT Inhaler Inhale 2 puffs into the lungs 4 times daily as needed      budesonide-formoterol (SYMBICORT) 160-4.5 MCG/ACT Inhaler Inhale 2 puffs into the lungs two times daily      cetirizine (ZYRTEC) 10 MG tablet Take 10 mg by mouth daily.      diphenhydrAMINE (BENADRYL) 25 MG capsule Take 25 mg by mouth every 6 hours as needed for allergies.      EPINEPHrine (ANY BX GENERIC EQUIV) 0.3 MG/0.3ML injection 2-pack Inject 0.3 mg into the muscle as needed.      fluticasone (FLONASE) 50 MCG/ACT nasal spray Spray 1 spray into both nostrils daily 9.9 mL 1    hydrOXYzine HCl (ATARAX) 10 MG tablet Take 1/2 to 1 tablet three times a day as needed for panic or anxiety attacks.      meclizine (ANTIVERT) 12.5 MG tablet Take 1-2 tablets (12.5- 25 mg) PO TID PRN vertigo 60 tablet 0    montelukast (SINGULAIR) 10 MG tablet Take 1 tablet by mouth at bedtime.      venlafaxine (EFFEXOR) 37.5 MG tablet Take 37.5 mg by mouth daily.      escitalopram (LEXAPRO) 20 MG tablet Take 20 mg by mouth daily      loratadine (CLARITIN) 10 MG tablet Take 10 mg by mouth daily (Patient not taking: Reported on 12/17/2024)       Allergies   Allergen Reactions    Bee Venom Angioedema    Food  "Anaphylaxis     Strawberries, melon,horshradish,mushrooms    Iodine Hives    Latex Hives    No Clinical Screening - See Comments Anaphylaxis and Hives     Melons, horse radish, mushrooms    Oxycodone-Acetaminophen Hives    Peanut-Containing Drug Products Cough, Dizziness and Other (See Comments)    Strawberry Extract Anaphylaxis and Hives    Aspirin Other (See Comments)     Dizzy    Aspirin Buf(Cacarb-Mgcarb-Mgo)     Gluten Meal Diarrhea    Metronidazole      Muscle spasms in neck.    Procaine Nausea and Vomiting    Seasonal      Past medical history, past surgical history, current medications and allergies reviewed and accurate to the best of my knowledge.      ROS:  Refer to HPI    /74   Pulse 94   Temp 98.9  F (37.2  C) (Tympanic)   Resp 17   Ht 1.575 m (5' 2\")   Wt 90.7 kg (200 lb)   LMP 12/05/2024 (Approximate)   SpO2 98%   BMI 36.58 kg/m      EXAM:  General Appearance: Well appearing 29 year old female, appropriate appearance for age. No acute distress   Eyes: conjunctivae normal without erythema or irritation, corneas clear, no drainage or crusting, no eyelid swelling, pupils equal   Respiratory: normal chest wall and respirations.  Normal effort. No increased work of breathing.  No cough appreciated.  Cardiac: RRR   Dermatological: no rashes noted of exposed skin  Neuro: Alert and oriented to person, place, and time. Muscle tone normal.  Gait normal. No tremor.   Psychological: normal affect, alert, oriented, and pleasant.       "

## 2024-12-17 NOTE — NURSING NOTE
"Chief Complaint   Patient presents with    Nausea     Patient here with dad for ongoing extreme nausea due to stress and anxiety. Has had a change in anxiety meds which have made her more nauseous. Has not been able to eat recently.     Initial /74   Pulse 94   Temp 98.9  F (37.2  C) (Tympanic)   Resp 17   Ht 1.575 m (5' 2\")   Wt 90.7 kg (200 lb)   LMP 12/05/2024 (Approximate)   SpO2 98%   BMI 36.58 kg/m   Estimated body mass index is 36.58 kg/m  as calculated from the following:    Height as of this encounter: 1.575 m (5' 2\").    Weight as of this encounter: 90.7 kg (200 lb).  Medication Review: complete    The next two questions are to help us understand your food security.  If you are feeling you need any assistance in this area, we have resources available to support you today.          12/17/2024   SDOH- Food Insecurity   Within the past 12 months, did you worry that your food would run out before you got money to buy more? N   Within the past 12 months, did the food you bought just not last and you didn t have money to get more? N            Health Care Directive:  Patient does not have a Health Care Directive: Discussed advance care planning with patient; however, patient declined at this time.    Carla Mehta LPN      "

## 2025-01-20 ENCOUNTER — HOSPITAL ENCOUNTER (OUTPATIENT)
Dept: MRI IMAGING | Facility: OTHER | Age: 30
Discharge: HOME | End: 2025-01-20
Payer: COMMERCIAL

## 2025-01-20 ENCOUNTER — HOSPITAL ENCOUNTER (OUTPATIENT)
Dept: GENERAL RADIOLOGY | Facility: OTHER | Age: 30
Discharge: HOME | End: 2025-01-20
Payer: COMMERCIAL

## 2025-01-20 DIAGNOSIS — M54.50 LOW BACK PAIN: ICD-10-CM

## 2025-01-20 PROCEDURE — 72148 MRI LUMBAR SPINE W/O DYE: CPT

## 2025-01-20 PROCEDURE — 72100 X-RAY EXAM L-S SPINE 2/3 VWS: CPT

## 2025-04-07 ENCOUNTER — OFFICE VISIT (OUTPATIENT)
Dept: FAMILY MEDICINE | Facility: OTHER | Age: 30
End: 2025-04-07
Attending: STUDENT IN AN ORGANIZED HEALTH CARE EDUCATION/TRAINING PROGRAM
Payer: COMMERCIAL

## 2025-04-07 VITALS
HEART RATE: 86 BPM | TEMPERATURE: 98.3 F | HEIGHT: 62 IN | SYSTOLIC BLOOD PRESSURE: 124 MMHG | WEIGHT: 209.2 LBS | RESPIRATION RATE: 18 BRPM | BODY MASS INDEX: 38.5 KG/M2 | DIASTOLIC BLOOD PRESSURE: 80 MMHG | OXYGEN SATURATION: 98 %

## 2025-04-07 DIAGNOSIS — J01.90 ACUTE SINUSITIS WITH SYMPTOMS > 10 DAYS: Primary | ICD-10-CM

## 2025-04-07 PROCEDURE — G0463 HOSPITAL OUTPT CLINIC VISIT: HCPCS

## 2025-04-07 PROCEDURE — 99213 OFFICE O/P EST LOW 20 MIN: CPT

## 2025-04-07 ASSESSMENT — PAIN SCALES - GENERAL: PAINLEVEL_OUTOF10: MODERATE PAIN (5)

## 2025-04-07 NOTE — PATIENT INSTRUCTIONS

## 2025-04-07 NOTE — NURSING NOTE
"Chief Complaint   Patient presents with    Sinus Problem     X2 weeks, blood when blowing nose   [facility] concerns of sinus pressure X2 weeks.     Initial /80 (BP Location: Right arm, Patient Position: Sitting, Cuff Size: Adult Regular)   Pulse 86   Temp 98.3  F (36.8  C) (Temporal)   Resp 18   Ht 1.575 m (5' 2\")   Wt 94.9 kg (209 lb 3.2 oz)   SpO2 98%   BMI 38.26 kg/m   Estimated body mass index is 38.26 kg/m  as calculated from the following:    Height as of this encounter: 1.575 m (5' 2\").    Weight as of this encounter: 94.9 kg (209 lb 3.2 oz).  Medication Review: complete    The next two questions are to help us understand your food security.  If you are feeling you need any assistance in this area, we have resources available to support you today.          4/7/2025   SDOH- Food Insecurity   Within the past 12 months, did you worry that your food would run out before you got money to buy more? N   Within the past 12 months, did the food you bought just not last and you didn t have money to get more? N         Health Care Directive:  Patient does not have a Health Care Directive: Discussed advance care planning with patient; however, patient declined at this time.    Cong Ca      "

## 2025-04-07 NOTE — PROGRESS NOTES
ASSESSMENT/PLAN:    I have reviewed the nursing notes.  I have reviewed the findings, diagnosis, plan and need for follow up with the patient.    1. Acute sinusitis with symptoms > 10 days (Primary)  - amoxicillin-clavulanate (AUGMENTIN) 875-125 MG tablet; Take 1 tablet by mouth 2 times daily for 7 days.  Dispense: 14 tablet; Refill: 0    Presents with sinus symptoms.  Patient's vitals are stable and she appears nontoxic.  Will treat for acute sinusitis with Augmentin.  Advised patient take this medication with food to reduce risk of GI upset. Discussed symptomatic treatment - Encouraged fluids, elevation, humidifier, sinus rinse/netti pot, rest, etc. May use over-the-counter Tylenol or ibuprofen PRN.    Discussed warning signs/symptoms indicative of need to f/u    Follow up if symptoms persist or worsen or concerns    I explained my diagnostic considerations and recommendations to the patient, who voiced understanding and agreement with the treatment plan. All questions were answered. We discussed potential side effects of any prescribed or recommended therapies, as well as expectations for response to treatments.    Rod Morales, ANRAUD CNP  4/7/2025  1:50 PM    HPI:    Iris Negron is a 29 year old female  who presents to [facility] today for concerns of sinus symptoms    sinus infection x 2 weeks.     Symptoms:   Location: bilateral maxillary  Nasal congestion: Yes: +  Purulent nasal discharge: Yes: +  Headache: Yes: +  Facial pain: Yes: +   Cough: Yes: +  Tooth pain/symptoms: No  Myalgias: No  Presence of fever: No   Halitosis: Yes: +   Anosmia: No    Metallic taste in the mouth: Yes: +     Treatments tried: antihistamine, nasal spray, netti pot with minimal improvement.     History of similar symptoms: Yes: hx of sinusitis   Prior workup: No    PCP: Dr. Melba Powell    Past Medical History:   Diagnosis Date    Acne     No Comments Provided    Major depressive disorder, single episode     No Comments  Provided    Uncomplicated asthma     No Comments Provided     Past Surgical History:   Procedure Laterality Date    APPENDECTOMY OPEN      2001    EXTRACTION(S) DENTAL      2011     Social History     Tobacco Use    Smoking status: Former    Smokeless tobacco: Never    Tobacco comments:     Quit smoking: smokes ecigs/vaping   Substance Use Topics    Alcohol use: Yes     Alcohol/week: 0.0 standard drinks of alcohol     Comment: occasionally     Current Outpatient Medications   Medication Sig Dispense Refill    albuterol (PROAIR HFA/PROVENTIL HFA/VENTOLIN HFA) 108 (90 BASE) MCG/ACT Inhaler Inhale 2 puffs into the lungs 4 times daily as needed      budesonide-formoterol (SYMBICORT) 160-4.5 MCG/ACT Inhaler Inhale 2 puffs into the lungs two times daily      cetirizine (ZYRTEC) 10 MG tablet Take 10 mg by mouth daily.      diphenhydrAMINE (BENADRYL) 25 MG capsule Take 25 mg by mouth every 6 hours as needed for allergies.      EPINEPHrine (ANY BX GENERIC EQUIV) 0.3 MG/0.3ML injection 2-pack Inject 0.3 mg into the muscle as needed.      fluticasone (FLONASE) 50 MCG/ACT nasal spray Spray 1 spray into both nostrils daily 9.9 mL 1    hydrOXYzine HCl (ATARAX) 10 MG tablet Take 1/2 to 1 tablet three times a day as needed for panic or anxiety attacks.      meclizine (ANTIVERT) 12.5 MG tablet Take 1-2 tablets (12.5- 25 mg) PO TID PRN vertigo 60 tablet 0    montelukast (SINGULAIR) 10 MG tablet Take 1 tablet by mouth at bedtime.      ondansetron (ZOFRAN ODT) 4 MG ODT tab Take 1 tablet (4 mg) by mouth every 8 hours as needed for nausea or vomiting. 30 tablet 0    venlafaxine (EFFEXOR) 37.5 MG tablet Take 37.5 mg by mouth daily.       Allergies   Allergen Reactions    Bee Venom Angioedema    Food Anaphylaxis     Strawberries, melon,horshradish,mushrooms    Iodine Hives    Latex Hives    No Clinical Screening - See Comments Anaphylaxis and Hives     Melons, horse radish, mushrooms    Oxycodone-Acetaminophen Hives    Peanut-Containing  "Drug Products Cough, Dizziness and Other (See Comments)    Strawberry Extract Anaphylaxis and Hives    Aspirin Other (See Comments)     Dizzy    Aspirin Buf(Cacarb-Mgcarb-Mgo)     Gluten Meal Diarrhea    Metronidazole      Muscle spasms in neck.    Procaine Nausea and Vomiting    Seasonal      Past medical history, past surgical history, current medications and allergies reviewed and accurate to the best of my knowledge.      ROS:  Refer to HPI    /80 (BP Location: Right arm, Patient Position: Sitting, Cuff Size: Adult Regular)   Pulse 86   Temp 98.3  F (36.8  C) (Temporal)   Resp 18   Ht 1.575 m (5' 2\")   Wt 94.9 kg (209 lb 3.2 oz)   SpO2 98%   BMI 38.26 kg/m      EXAM:  General Appearance: Well appearing 29 year old female, appropriate appearance for age. No acute distress   Ears: Left TM intact, translucent with bony landmarks appreciated, no erythema, no effusion, no bulging, no purulence.  Right TM intact, translucent with bony landmarks appreciated, no erythema, no effusion, no bulging, no purulence.  Left auditory canal clear.  Right auditory canal clear.  Normal external ears, non tender.  Eyes: conjunctivae normal without erythema or irritation, corneas clear, no drainage or crusting, no eyelid swelling, pupils equal   Oropharynx: moist mucous membranes, posterior pharynx without erythema, tonsils symmetric and 1+, no erythema, no exudates or petechiae, no post nasal drip seen, no trismus, voice clear.    Sinuses:  Sinus tenderness upon palpation of the maxillary sinuses  Nose:  Bilateral nares: no erythema, no edema, moderate congestion noted  Neck: supple without adenopathy  Respiratory: normal chest wall and respirations.  Normal effort.  Clear to auscultation bilaterally, no wheezing, crackles or rhonchi.  No increased work of breathing.  No cough appreciated.  Cardiac: RRR with no murmurs  Musculoskeletal:  Equal movement of bilateral upper extremities.  Equal movement of bilateral lower " extremities.  Normal gait.    Dermatological: no rashes noted of exposed skin  Neuro: Alert and oriented to person, place, and time.    Psychological: normal affect, alert, oriented, and pleasant.

## 2025-04-17 ENCOUNTER — HOSPITAL ENCOUNTER (EMERGENCY)
Facility: OTHER | Age: 30
Discharge: HOME | End: 2025-04-17
Payer: COMMERCIAL

## 2025-04-17 VITALS
DIASTOLIC BLOOD PRESSURE: 74 MMHG | RESPIRATION RATE: 18 BRPM | HEART RATE: 95 BPM | SYSTOLIC BLOOD PRESSURE: 112 MMHG | OXYGEN SATURATION: 97 % | TEMPERATURE: 98.3 F

## 2025-04-17 DIAGNOSIS — R05.9 COUGH: ICD-10-CM

## 2025-04-17 PROCEDURE — 250N000013 HC RX MED GY IP 250 OP 250 PS 637

## 2025-04-17 PROCEDURE — 99283 EMERGENCY DEPT VISIT LOW MDM: CPT

## 2025-04-17 PROCEDURE — 99282 EMERGENCY DEPT VISIT SF MDM: CPT

## 2025-04-17 RX ORDER — BENZONATATE 100 MG/1
100 CAPSULE ORAL 3 TIMES DAILY PRN
Qty: 30 CAPSULE | Refills: 0 | Status: SHIPPED | OUTPATIENT
Start: 2025-04-17

## 2025-04-17 RX ORDER — BENZONATATE 100 MG/1
100 CAPSULE ORAL ONCE
Status: COMPLETED | OUTPATIENT
Start: 2025-04-17 | End: 2025-04-17

## 2025-04-17 RX ADMIN — BENZONATATE 100 MG: 100 CAPSULE ORAL at 20:38

## 2025-04-17 ASSESSMENT — ACTIVITIES OF DAILY LIVING (ADL)
ADLS_ACUITY_SCORE: 41
ADLS_ACUITY_SCORE: 41

## 2025-04-17 ASSESSMENT — COLUMBIA-SUICIDE SEVERITY RATING SCALE - C-SSRS
1. IN THE PAST MONTH, HAVE YOU WISHED YOU WERE DEAD OR WISHED YOU COULD GO TO SLEEP AND NOT WAKE UP?: NO
2. HAVE YOU ACTUALLY HAD ANY THOUGHTS OF KILLING YOURSELF IN THE PAST MONTH?: NO
6. HAVE YOU EVER DONE ANYTHING, STARTED TO DO ANYTHING, OR PREPARED TO DO ANYTHING TO END YOUR LIFE?: NO

## 2025-04-17 ASSESSMENT — ENCOUNTER SYMPTOMS: COUGH: 1

## 2025-04-17 NOTE — ED TRIAGE NOTES
Pt arrives with concerns of worsening cough that made her throat spasm'. She states she just finished an antibiotic for a sinus infection. Denies fevers. No OTC medications today.   /74   Pulse 95   Temp 98.3  F (36.8  C)   Resp 18   SpO2 97%       Triage Assessment (Adult)       Row Name 04/17/25 1818          Triage Assessment    Airway WDL WDL        Respiratory WDL    Respiratory WDL X        Skin Circulation/Temperature WDL    Skin Circulation/Temperature WDL WDL        Cardiac WDL    Cardiac WDL WDL        Peripheral/Neurovascular WDL    Peripheral Neurovascular WDL WDL        Cognitive/Neuro/Behavioral WDL    Cognitive/Neuro/Behavioral WDL WDL

## 2025-04-18 NOTE — DISCHARGE INSTRUCTIONS
Honey with tea, humidity, hydration, cough drops  Tessalon pearls as needed for cough.  Return to be seen if new, worsening complaints.  I hope you feel better soon.

## 2025-04-18 NOTE — ED PROVIDER NOTES
History     Chief Complaint   Patient presents with    Pharyngitis    Cough     The history is provided by the patient.     Iris Negron is a 29 year old female who presents to the emergency department today complaining of a coughing fit this afternoon and throat spasming.  Since then she has had an ongoing cough.  Reports that she was recently treated for a sinus infection and had been feeling better up until the coughing spasming this afternoon.  Coughed so hard that she gagged.  Otherwise denies sore throat, chest pain, fevers, shortness of breath, wheezing.     Allergies:  Allergies   Allergen Reactions    Bee Venom Angioedema    Food Anaphylaxis     Strawberries, melon,horshradish,mushrooms    Iodine Hives    Latex Hives    No Clinical Screening - See Comments Anaphylaxis and Hives     Melons, horse radish, mushrooms    Oxycodone-Acetaminophen Hives    Peanut-Containing Drug Products Cough, Dizziness and Other (See Comments)    Strawberry Extract Anaphylaxis and Hives    Aspirin Other (See Comments)     Dizzy    Aspirin Buf(Cacarb-Mgcarb-Mgo)     Gluten Meal Diarrhea    Metronidazole      Muscle spasms in neck.    Procaine Nausea and Vomiting    Seasonal        Problem List:    Patient Active Problem List    Diagnosis Date Noted    Dysmenorrhea 02/09/2018     Priority: Medium    Irregular menses 02/09/2018     Priority: Medium    Allergic rhinitis 02/09/2018     Priority: Medium     Overview:   Intermittent, seasonal      Asthma 02/09/2018     Priority: Medium    Allergy 08/03/2016     Priority: Medium     Overview:   Strawberries and melons      Chronic tension-type headache, not intractable 08/03/2016     Priority: Medium    Gastroenteritis 12/28/2012     Priority: Medium    Dysthymic disorder 03/29/2011     Priority: Medium    Obesity 02/16/2010     Priority: Medium    Acne, mild 02/08/2008     Priority: Medium        Past Medical History:    Past Medical History:   Diagnosis Date    Acne     Major  depressive disorder, single episode     Uncomplicated asthma        Past Surgical History:    Past Surgical History:   Procedure Laterality Date    APPENDECTOMY OPEN      2001    EXTRACTION(S) DENTAL      2011       Family History:    Family History   Problem Relation Age of Onset    Other - See Comments Mother         Didelphic uterus/menorrhagia/hysterectomy at age 25    Substance Abuse Mother         Alcohol/Drug,CD    Other - See Comments Paternal Grandmother         Psychiatric illness,Mental illness    Breast Cancer Maternal Grandmother         Cancer-breast    Diabetes Maternal Grandmother         Diabetes    Hypertension Maternal Grandmother         Hypertension    Diabetes Maternal Aunt         Diabetes    Diabetes Maternal Grandfather         Diabetes    Hypertension Maternal Grandfather         Hypertension    Hypertension Maternal Aunt         Hypertension    Substance Abuse Maternal Uncle         Alcohol/Drug    Substance Abuse Maternal Uncle         Alcohol/Drug       Social History:  Marital Status:  Single [1]  Social History     Tobacco Use    Smoking status: Former    Smokeless tobacco: Never    Tobacco comments:     Quit smoking: smokes ecigs/vaping   Vaping Use    Vaping status: Former   Substance Use Topics    Alcohol use: Yes     Alcohol/week: 0.0 standard drinks of alcohol     Comment: occasionally    Drug use: Never        Medications:    benzonatate (TESSALON) 100 MG capsule  albuterol (PROAIR HFA/PROVENTIL HFA/VENTOLIN HFA) 108 (90 BASE) MCG/ACT Inhaler  budesonide-formoterol (SYMBICORT) 160-4.5 MCG/ACT Inhaler  cetirizine (ZYRTEC) 10 MG tablet  diphenhydrAMINE (BENADRYL) 25 MG capsule  EPINEPHrine (ANY BX GENERIC EQUIV) 0.3 MG/0.3ML injection 2-pack  fluticasone (FLONASE) 50 MCG/ACT nasal spray  hydrOXYzine HCl (ATARAX) 10 MG tablet  meclizine (ANTIVERT) 12.5 MG tablet  montelukast (SINGULAIR) 10 MG tablet  ondansetron (ZOFRAN ODT) 4 MG ODT tab  venlafaxine (EFFEXOR) 37.5 MG  tablet          Review of Systems   Respiratory:  Positive for cough.    All other systems reviewed and are negative.  See HPI    Physical Exam   BP: 112/74  Pulse: 95  Temp: 98.3  F (36.8  C)  Resp: 18  SpO2: 97 %      Physical Exam  Vitals and nursing note reviewed.   Constitutional:       General: She is not in acute distress.     Appearance: She is well-developed. She is not ill-appearing or toxic-appearing.   HENT:      Right Ear: Tympanic membrane normal.      Left Ear: Tympanic membrane normal.      Mouth/Throat:      Lips: Pink.      Mouth: Mucous membranes are moist.      Pharynx: Oropharynx is clear. No pharyngeal swelling, oropharyngeal exudate, posterior oropharyngeal erythema or uvula swelling.      Tonsils: No tonsillar exudate or tonsillar abscesses.   Cardiovascular:      Rate and Rhythm: Normal rate and regular rhythm.   Abdominal:      Palpations: Abdomen is soft.   Musculoskeletal:      Cervical back: Neck supple.   Lymphadenopathy:      Comments: No lymphadenopathy   Skin:     General: Skin is warm.      Capillary Refill: Capillary refill takes less than 2 seconds.   Neurological:      General: No focal deficit present.      Mental Status: She is alert.   Psychiatric:         Mood and Affect: Mood normal.         ED Course         No results found for this or any previous visit (from the past 24 hours).    Medications   benzonatate (TESSALON) capsule 100 mg (has no administration in time range)       Assessments & Plan (with Medical Decision Making)  Iris Negron is a 29 year old female who presents to the emergency department today complaining of a coughing fit this afternoon and throat spasming.  Since then she has had an ongoing cough.  Reports that she was recently treated for a sinus infection and had been feeling better up until the coughing spasming this afternoon.  Coughed so hard that she gagged.  Otherwise denies sore throat, chest pain, fevers, shortness of breath or wheezing.   BP  112/74   Pulse 95   Temp 98.3  F (36.8  C)   Resp 18   LMP 04/03/2025 (Approximate)   SpO2 97%   Patient is vitally stable and afebrile.  Unremarkable stable physical examination.  I do not appreciate any emergent concern for her cough today.  Lung sounds are clear breathing is unlabored no wheezing.  We will treat her symptomatically with Tessalon Perles, symptomatic cough management explained for home, advised follow-up as needed returning if new or worsening complaints.  Patient will discharge home in stable condition verbal understanding of discharge instructions were given.     I have reviewed the nursing notes.    I have reviewed the findings, diagnosis, plan and need for follow up with the patient.    Medical Decision Making  The patient's presentation was of straightforward complexity (a clearly self-limited or minor problem).    The patient's evaluation involved:  history and exam without other MDM data elements    The patient's management necessitated moderate risk (prescription drug management including medications given in the ED).        New Prescriptions    BENZONATATE (TESSALON) 100 MG CAPSULE    Take 1 capsule (100 mg) by mouth 3 times daily as needed for cough.       Final diagnoses:   Cough       4/17/2025   [facility] HospitalSofi hyatt, APRN CNP  04/17/25 2035

## 2025-07-19 ENCOUNTER — HEALTH MAINTENANCE LETTER (OUTPATIENT)
Age: 30
End: 2025-07-19

## (undated) RX ORDER — ONDANSETRON 2 MG/ML
INJECTION INTRAMUSCULAR; INTRAVENOUS
Status: DISPENSED
Start: 2021-11-25

## (undated) RX ORDER — BENZONATATE 100 MG/1
CAPSULE ORAL
Status: DISPENSED
Start: 2025-04-17

## (undated) RX ORDER — SODIUM CHLORIDE 9 MG/ML
INJECTION, SOLUTION INTRAVENOUS
Status: DISPENSED
Start: 2021-11-25